# Patient Record
Sex: MALE | Race: WHITE | NOT HISPANIC OR LATINO | URBAN - METROPOLITAN AREA
[De-identification: names, ages, dates, MRNs, and addresses within clinical notes are randomized per-mention and may not be internally consistent; named-entity substitution may affect disease eponyms.]

---

## 2018-01-09 ENCOUNTER — COMMUNICATION - HEALTHEAST (OUTPATIENT)
Dept: SCHEDULING | Facility: CLINIC | Age: 38
End: 2018-01-09

## 2018-01-12 ENCOUNTER — OFFICE VISIT - HEALTHEAST (OUTPATIENT)
Dept: FAMILY MEDICINE | Facility: CLINIC | Age: 38
End: 2018-01-12

## 2018-01-12 DIAGNOSIS — I10 HYPERTENSION: ICD-10-CM

## 2018-01-12 DIAGNOSIS — F41.9 ANXIETY: ICD-10-CM

## 2018-01-12 ASSESSMENT — MIFFLIN-ST. JEOR: SCORE: 2299.7

## 2018-01-16 ENCOUNTER — OFFICE VISIT - HEALTHEAST (OUTPATIENT)
Dept: BEHAVIORAL HEALTH | Facility: HOSPITAL | Age: 38
End: 2018-01-16

## 2018-01-16 DIAGNOSIS — F41.1 GAD (GENERALIZED ANXIETY DISORDER): ICD-10-CM

## 2018-01-24 ENCOUNTER — AMBULATORY - HEALTHEAST (OUTPATIENT)
Dept: BEHAVIORAL HEALTH | Facility: CLINIC | Age: 38
End: 2018-01-24

## 2018-01-26 ENCOUNTER — OFFICE VISIT - HEALTHEAST (OUTPATIENT)
Dept: FAMILY MEDICINE | Facility: CLINIC | Age: 38
End: 2018-01-26

## 2018-01-26 DIAGNOSIS — F41.9 ANXIETY: ICD-10-CM

## 2018-01-26 DIAGNOSIS — I10 HTN (HYPERTENSION): ICD-10-CM

## 2018-01-30 ENCOUNTER — OFFICE VISIT - HEALTHEAST (OUTPATIENT)
Dept: BEHAVIORAL HEALTH | Facility: HOSPITAL | Age: 38
End: 2018-01-30

## 2018-01-30 DIAGNOSIS — F41.1 GAD (GENERALIZED ANXIETY DISORDER): ICD-10-CM

## 2018-02-09 ENCOUNTER — COMMUNICATION - HEALTHEAST (OUTPATIENT)
Dept: INTERNAL MEDICINE | Facility: CLINIC | Age: 38
End: 2018-02-09

## 2018-02-09 ENCOUNTER — OFFICE VISIT - HEALTHEAST (OUTPATIENT)
Dept: FAMILY MEDICINE | Facility: CLINIC | Age: 38
End: 2018-02-09

## 2018-02-09 DIAGNOSIS — F41.9 ANXIETY: ICD-10-CM

## 2018-02-09 DIAGNOSIS — I10 HYPERTENSION, UNSPECIFIED TYPE: ICD-10-CM

## 2018-02-09 LAB
ANION GAP SERPL CALCULATED.3IONS-SCNC: 12 MMOL/L (ref 5–18)
BUN SERPL-MCNC: 23 MG/DL (ref 8–22)
CALCIUM SERPL-MCNC: 10 MG/DL (ref 8.5–10.5)
CHLORIDE BLD-SCNC: 101 MMOL/L (ref 98–107)
CO2 SERPL-SCNC: 28 MMOL/L (ref 22–31)
CREAT SERPL-MCNC: 1.3 MG/DL (ref 0.7–1.3)
GFR SERPL CREATININE-BSD FRML MDRD: >60 ML/MIN/1.73M2
GLUCOSE BLD-MCNC: 95 MG/DL (ref 70–125)
POTASSIUM BLD-SCNC: 4.5 MMOL/L (ref 3.5–5)
SODIUM SERPL-SCNC: 141 MMOL/L (ref 136–145)

## 2018-02-20 ENCOUNTER — OFFICE VISIT - HEALTHEAST (OUTPATIENT)
Dept: BEHAVIORAL HEALTH | Facility: HOSPITAL | Age: 38
End: 2018-02-20

## 2018-02-20 DIAGNOSIS — F41.1 GAD (GENERALIZED ANXIETY DISORDER): ICD-10-CM

## 2018-08-01 ENCOUNTER — COMMUNICATION - HEALTHEAST (OUTPATIENT)
Dept: INTERNAL MEDICINE | Facility: CLINIC | Age: 38
End: 2018-08-01

## 2018-08-01 DIAGNOSIS — I10 HYPERTENSION, UNSPECIFIED TYPE: ICD-10-CM

## 2018-08-13 ENCOUNTER — OFFICE VISIT - HEALTHEAST (OUTPATIENT)
Dept: FAMILY MEDICINE | Facility: CLINIC | Age: 38
End: 2018-08-13

## 2018-08-13 DIAGNOSIS — I10 HYPERTENSION, UNSPECIFIED TYPE: ICD-10-CM

## 2018-08-13 DIAGNOSIS — Z23 NEED FOR TDAP VACCINATION: ICD-10-CM

## 2019-02-07 ENCOUNTER — OFFICE VISIT - HEALTHEAST (OUTPATIENT)
Dept: FAMILY MEDICINE | Facility: CLINIC | Age: 39
End: 2019-02-07

## 2019-02-07 DIAGNOSIS — I10 HYPERTENSION, UNSPECIFIED TYPE: ICD-10-CM

## 2019-02-07 DIAGNOSIS — F41.9 ANXIETY: ICD-10-CM

## 2019-02-07 LAB
ANION GAP SERPL CALCULATED.3IONS-SCNC: 10 MMOL/L (ref 5–18)
BUN SERPL-MCNC: 20 MG/DL (ref 8–22)
CALCIUM SERPL-MCNC: 9.6 MG/DL (ref 8.5–10.5)
CHLORIDE BLD-SCNC: 102 MMOL/L (ref 98–107)
CHOLEST SERPL-MCNC: 164 MG/DL
CO2 SERPL-SCNC: 27 MMOL/L (ref 22–31)
CREAT SERPL-MCNC: 1.3 MG/DL (ref 0.7–1.3)
FASTING STATUS PATIENT QL REPORTED: NO
GFR SERPL CREATININE-BSD FRML MDRD: >60 ML/MIN/1.73M2
GLUCOSE BLD-MCNC: 95 MG/DL (ref 70–125)
HDLC SERPL-MCNC: 28 MG/DL
LDLC SERPL CALC-MCNC: 74 MG/DL
POTASSIUM BLD-SCNC: 4.3 MMOL/L (ref 3.5–5)
SODIUM SERPL-SCNC: 139 MMOL/L (ref 136–145)
TRIGL SERPL-MCNC: 311 MG/DL

## 2019-02-11 ENCOUNTER — COMMUNICATION - HEALTHEAST (OUTPATIENT)
Dept: FAMILY MEDICINE | Facility: CLINIC | Age: 39
End: 2019-02-11

## 2019-06-04 ENCOUNTER — COMMUNICATION - HEALTHEAST (OUTPATIENT)
Dept: FAMILY MEDICINE | Facility: CLINIC | Age: 39
End: 2019-06-04

## 2019-06-04 DIAGNOSIS — I10 HYPERTENSION, UNSPECIFIED TYPE: ICD-10-CM

## 2019-06-04 DIAGNOSIS — F41.9 ANXIETY: ICD-10-CM

## 2019-08-07 ENCOUNTER — OFFICE VISIT - HEALTHEAST (OUTPATIENT)
Dept: FAMILY MEDICINE | Facility: CLINIC | Age: 39
End: 2019-08-07

## 2019-08-07 DIAGNOSIS — I10 HYPERTENSION, UNSPECIFIED TYPE: ICD-10-CM

## 2019-08-07 DIAGNOSIS — E78.1 HYPERTRIGLYCERIDEMIA: ICD-10-CM

## 2019-08-07 DIAGNOSIS — F41.9 ANXIETY: ICD-10-CM

## 2019-08-07 LAB
ANION GAP SERPL CALCULATED.3IONS-SCNC: 10 MMOL/L (ref 5–18)
BUN SERPL-MCNC: 15 MG/DL (ref 8–22)
CALCIUM SERPL-MCNC: 9.9 MG/DL (ref 8.5–10.5)
CHLORIDE BLD-SCNC: 103 MMOL/L (ref 98–107)
CHOLEST SERPL-MCNC: 161 MG/DL
CO2 SERPL-SCNC: 26 MMOL/L (ref 22–31)
CREAT SERPL-MCNC: 1.2 MG/DL (ref 0.7–1.3)
FASTING STATUS PATIENT QL REPORTED: YES
GFR SERPL CREATININE-BSD FRML MDRD: >60 ML/MIN/1.73M2
GLUCOSE BLD-MCNC: 96 MG/DL (ref 70–125)
HDLC SERPL-MCNC: 26 MG/DL
LDLC SERPL CALC-MCNC: 89 MG/DL
POTASSIUM BLD-SCNC: 4.1 MMOL/L (ref 3.5–5)
SODIUM SERPL-SCNC: 139 MMOL/L (ref 136–145)
TRIGL SERPL-MCNC: 230 MG/DL

## 2019-08-12 ENCOUNTER — COMMUNICATION - HEALTHEAST (OUTPATIENT)
Dept: FAMILY MEDICINE | Facility: CLINIC | Age: 39
End: 2019-08-12

## 2020-08-06 ENCOUNTER — OFFICE VISIT - HEALTHEAST (OUTPATIENT)
Dept: UROLOGY | Facility: CLINIC | Age: 40
End: 2020-08-06

## 2020-08-06 ENCOUNTER — COMMUNICATION - HEALTHEAST (OUTPATIENT)
Dept: UROLOGY | Facility: CLINIC | Age: 40
End: 2020-08-06

## 2020-08-06 DIAGNOSIS — N13.2 HYDRONEPHROSIS WITH URINARY OBSTRUCTION DUE TO URETERAL CALCULUS: ICD-10-CM

## 2020-08-06 DIAGNOSIS — N20.1 CALCULUS OF URETER: ICD-10-CM

## 2020-08-24 ENCOUNTER — HOSPITAL ENCOUNTER (OUTPATIENT)
Dept: CT IMAGING | Facility: HOSPITAL | Age: 40
Discharge: HOME | End: 2020-08-24
Attending: PHYSICIAN ASSISTANT

## 2020-08-24 DIAGNOSIS — N20.1 CALCULUS OF URETER: ICD-10-CM

## 2020-08-25 ENCOUNTER — OFFICE VISIT - HEALTHEAST (OUTPATIENT)
Dept: UROLOGY | Facility: CLINIC | Age: 40
End: 2020-08-25

## 2020-08-25 DIAGNOSIS — N20.1 CALCULUS OF URETER: ICD-10-CM

## 2020-08-25 DIAGNOSIS — N13.2 HYDRONEPHROSIS WITH URINARY OBSTRUCTION DUE TO URETERAL CALCULUS: ICD-10-CM

## 2020-08-26 ENCOUNTER — COMMUNICATION - HEALTHEAST (OUTPATIENT)
Dept: FAMILY MEDICINE | Facility: CLINIC | Age: 40
End: 2020-08-26

## 2020-08-26 DIAGNOSIS — I10 HYPERTENSION, UNSPECIFIED TYPE: ICD-10-CM

## 2020-08-28 ENCOUNTER — OFFICE VISIT - HEALTHEAST (OUTPATIENT)
Dept: FAMILY MEDICINE | Facility: CLINIC | Age: 40
End: 2020-08-28

## 2020-08-28 DIAGNOSIS — I10 HYPERTENSION, UNSPECIFIED TYPE: ICD-10-CM

## 2020-08-28 DIAGNOSIS — F41.9 ANXIETY: ICD-10-CM

## 2020-08-28 DIAGNOSIS — Z13.220 LIPID SCREENING: ICD-10-CM

## 2020-08-28 DIAGNOSIS — R73.9 BLOOD GLUCOSE ELEVATED: ICD-10-CM

## 2020-08-28 LAB
ANION GAP SERPL CALCULATED.3IONS-SCNC: 11 MMOL/L (ref 5–18)
BUN SERPL-MCNC: 28 MG/DL (ref 8–22)
CALCIUM SERPL-MCNC: 9.6 MG/DL (ref 8.5–10.5)
CHLORIDE BLD-SCNC: 101 MMOL/L (ref 98–107)
CHOLEST SERPL-MCNC: 154 MG/DL
CO2 SERPL-SCNC: 26 MMOL/L (ref 22–31)
CREAT SERPL-MCNC: 1.5 MG/DL (ref 0.7–1.3)
FASTING STATUS PATIENT QL REPORTED: NO
GFR SERPL CREATININE-BSD FRML MDRD: 52 ML/MIN/1.73M2
GLUCOSE BLD-MCNC: 94 MG/DL (ref 70–125)
HBA1C MFR BLD: 5.4 %
HDLC SERPL-MCNC: 25 MG/DL
LDLC SERPL CALC-MCNC: 96 MG/DL
POTASSIUM BLD-SCNC: 4.4 MMOL/L (ref 3.5–5)
SODIUM SERPL-SCNC: 138 MMOL/L (ref 136–145)
TRIGL SERPL-MCNC: 166 MG/DL

## 2020-08-28 RX ORDER — LORATADINE 10 MG/1
10 TABLET ORAL DAILY
Status: SHIPPED | COMMUNITY
Start: 2020-08-28

## 2020-08-28 RX ORDER — HYDROCHLOROTHIAZIDE 25 MG/1
25 TABLET ORAL DAILY
Qty: 90 TABLET | Refills: 3 | Status: SHIPPED | OUTPATIENT
Start: 2020-08-28 | End: 2021-10-19

## 2020-08-28 RX ORDER — LISINOPRIL 30 MG/1
30 TABLET ORAL DAILY
Qty: 90 TABLET | Refills: 3 | Status: SHIPPED | OUTPATIENT
Start: 2020-08-28 | End: 2021-10-19

## 2020-08-28 RX ORDER — HYDROXYZINE HYDROCHLORIDE 25 MG/1
25 TABLET, FILM COATED ORAL
Qty: 90 TABLET | Refills: 3 | Status: SHIPPED | OUTPATIENT
Start: 2020-08-28 | End: 2022-01-19

## 2020-08-28 ASSESSMENT — MIFFLIN-ST. JEOR: SCORE: 2346.88

## 2020-08-31 ENCOUNTER — COMMUNICATION - HEALTHEAST (OUTPATIENT)
Dept: FAMILY MEDICINE | Facility: CLINIC | Age: 40
End: 2020-08-31

## 2021-02-23 ENCOUNTER — COMMUNICATION - HEALTHEAST (OUTPATIENT)
Dept: ADMINISTRATIVE | Facility: CLINIC | Age: 41
End: 2021-02-23

## 2021-02-26 ENCOUNTER — OFFICE VISIT - HEALTHEAST (OUTPATIENT)
Dept: FAMILY MEDICINE | Facility: CLINIC | Age: 41
End: 2021-02-26

## 2021-02-26 DIAGNOSIS — Z23 NEED FOR VACCINATION: ICD-10-CM

## 2021-02-26 DIAGNOSIS — I10 HYPERTENSION, UNSPECIFIED TYPE: ICD-10-CM

## 2021-02-26 ASSESSMENT — MIFFLIN-ST. JEOR: SCORE: 2243.46

## 2021-05-29 NOTE — TELEPHONE ENCOUNTER
Refill Request  Did you contact pharmacy: Yes Patient is in [facility] andforgot his medication at home.  He is asking for two days a medicaiton for each med ASAP please.   Medication name:   Requested Prescriptions     Pending Prescriptions Disp Refills     propranolol (INDERAL LA) 60 mg 24 hr capsule 90 capsule 1     Sig: Take 1 capsule (60 mg total) by mouth daily.     lisinopril (PRINIVIL,ZESTRIL) 30 MG tablet 90 tablet 1     Sig: Take 1 tablet (30 mg total) by mouth daily.     hydrOXYzine HCl (ATARAX) 25 MG tablet 90 tablet 1     Sig: TK 1 TO 2 TS PO TID PRF ANXIETY     Who prescribed the medication: PCPPharmacy Name and Location: [facility] [address] [place] [address]  Is patient out of medication: Yes  Patient notified refills processed in 72 hours:  no  Okay to leave a detailed message: yes [phone]

## 2021-05-30 ENCOUNTER — HEALTH MAINTENANCE LETTER (OUTPATIENT)
Age: 41
End: 2021-05-30

## 2021-05-31 VITALS — HEIGHT: 70 IN | WEIGHT: 307.2 LBS | BODY MASS INDEX: 43.98 KG/M2

## 2021-05-31 VITALS — BODY MASS INDEX: 43.91 KG/M2 | WEIGHT: 306 LBS

## 2021-05-31 NOTE — PROGRESS NOTES
Assessment/Plan:        1. Hypertension, unspecified type  Normotension  Continue with current management  - hydroCHLOROthiazide (HYDRODIURIL) 25 MG tablet; Take 1 tablet (25 mg total) by mouth daily.  Dispense: 90 tablet; Refill: 3  - lisinopril (PRINIVIL,ZESTRIL) 30 MG tablet; Take 1 tablet (30 mg total) by mouth daily.  Dispense: 90 tablet; Refill: 3  - propranolol (INDERAL LA) 60 mg 24 hr capsule; Take 1 capsule (60 mg total) by mouth daily.  Dispense: 90 capsule; Refill: 3    - Basic Metabolic Panel; Future      2. Anxiety  Doing well the current plan  Continue as is    3. Hypertriglyceridemia  Recheck lab  - Lipid Profile   Elevated Triglycerides, but better in comparison than 6 months ago      Follow-up in 6 to 12 months or sooner with questions or concerns.       Subjective:    Patient ID:   Mauri Manzanares is a 38 y.o. male comes in for follow up of    1. Hypertension, unspecified type    2. Anxiety    3. Hypertriglyceridemia        Reports to be doing well with no medication side effects or intolerance, and been able to keep the blood pressure in the normal range with the current regimen.  Terms of anxiety doing well on hydroxyzine taking only as needed and happy with the current management.    Review of Systems  Allergy: reviewed  General : negative  A complete 5 point review of systems was obtained and is negative other than what is stated in the HPI.       The following patient's history were reviewed and updated as appropriate:   He  has a past medical history of Hypertension..      Outpatient Encounter Medications as of 8/7/2019   Medication Sig Dispense Refill     hydrOXYzine HCl (ATARAX) 25 MG tablet TK 1 TO 2 TS PO TID PRF ANXIETY 6 tablet 0     propranolol (INDERAL LA) 60 mg 24 hr capsule Take 1 capsule (60 mg total) by mouth daily. 3 capsule 0     traZODone (DESYREL) 50 MG tablet TK 1 TO 2 TS PO D HS PRF SLEEP  1     hydroCHLOROthiazide (HYDRODIURIL) 25 MG tablet Take 1 tablet (25 mg total) by  mouth daily. 90 tablet 1     lisinopril (PRINIVIL,ZESTRIL) 30 MG tablet Take 1 tablet (30 mg total) by mouth daily. 3 tablet 0     No facility-administered encounter medications on file as of 8/7/2019.          Objective:   /80 (Patient Site: Right Arm, Patient Position: Sitting, Cuff Size: Adult Large)   Pulse 86   Wt (!) 343 lb (155.6 kg)   SpO2 97%   BMI 49.22 kg/m        Physical Exam  General Appearance:    Alert, well hydrated, no distress   Throat:   mucous membranes moist, pharynx normal without lesions   Neck:   Supple, symmetrical, trachea midline, no adenopathy;     thyroid:  no enlargement/tenderness/nodules;    Lungs:     clear to auscultation, no wheezes, rales or rhonchi, symmetric air entry     Heart:    Regular rate and rhythm, S1 and S2 normal, no murmur, rub   or gallop, no edema    Skin:   Skin color, texture, turgor normal, no rashes or lesions

## 2021-06-01 VITALS — WEIGHT: 315 LBS | BODY MASS INDEX: 47.51 KG/M2

## 2021-06-01 VITALS — WEIGHT: 307.2 LBS | BODY MASS INDEX: 44.08 KG/M2

## 2021-06-02 VITALS — WEIGHT: 315 LBS | BODY MASS INDEX: 48.83 KG/M2

## 2021-06-03 VITALS — WEIGHT: 315 LBS | BODY MASS INDEX: 49.22 KG/M2

## 2021-06-04 VITALS
SYSTOLIC BLOOD PRESSURE: 118 MMHG | HEART RATE: 80 BPM | OXYGEN SATURATION: 98 % | BODY MASS INDEX: 46.65 KG/M2 | TEMPERATURE: 97.2 F | DIASTOLIC BLOOD PRESSURE: 72 MMHG | HEIGHT: 69 IN | RESPIRATION RATE: 22 BRPM | WEIGHT: 315 LBS

## 2021-06-05 VITALS
TEMPERATURE: 97.5 F | WEIGHT: 298.3 LBS | HEART RATE: 66 BPM | HEIGHT: 69 IN | RESPIRATION RATE: 18 BRPM | DIASTOLIC BLOOD PRESSURE: 76 MMHG | OXYGEN SATURATION: 100 % | SYSTOLIC BLOOD PRESSURE: 130 MMHG | BODY MASS INDEX: 44.18 KG/M2

## 2021-06-10 NOTE — PROGRESS NOTES
Assessment/Plan:        1. Hypertension, unspecified type  Normotension  Continue current management.     Refil;   - propranoloL (INDERAL LA) 60 mg 24 hr capsule; Take 1 capsule (60 mg total) by mouth daily.  Dispense: 90 capsule; Refill: 3  - lisinopriL (PRINIVIL,ZESTRIL) 30 MG tablet; Take 1 tablet (30 mg total) by mouth daily.  Dispense: 90 tablet; Refill: 3  - hydroCHLOROthiazide (HYDRODIURIL) 25 MG tablet; Take 1 tablet (25 mg total) by mouth daily.  Dispense: 90 tablet; Refill: 3      - Basic Metabolic Panel  Elevated creatinine  Most likely due to use of blood pressure medication  No significant change from the prior study  Keep hydrated and continue with the current management    2. Blood glucose elevated  - Glycosylated Hemoglobin A1c  Normal A1c, with a value of 5.4 very close to prediabetic range    Normal <5.7% Prediabete 5.7-6.4%  Recheck level in 6 months     3. Lipid screening    - Lipid Profile  The 10-year ASCVD risk score ([place] DC Jr., et al., 2013) is: 2%    Routine diet and exercise advised  Recheck in 1 year    4. Anxiety  Refill Hydroxyzine.         At the conclusion of the encounter the plan of care, disposition and all questions were answered and reviewed, and the patient acknowledged understanding and was involved in the decision making regarding the overall care plan.     Patient Care Team:  Dave Kirk MD as PCP - General (Family Medicine)  Dave Kirk MD as Assigned PCP    45  minutes spent on this visit with more than 50% time spent on  reviewing medical record, education, providing supportive therapy regarding coping with chronic illness, entering orders, reviewing and commenting on the test results, and preparing documentation for the visit           Subjective:    Patient ID:   Mauri Manzanares is a 39 y.o. male comes in 1 year follow-up of hypertension managed on hydrochlorothiazide 25 mg, lisinopril 30 mg daily and propanolol 60 mg daily, without side effects  "or intolerance , needing a refill.     He has no other concerns.      Review of Systems  Allergy: reviewed  General : negative  A complete 5 point review of systems was obtained and is negative other than what is stated in the HPI.      The following patient's history were reviewed and updated as appropriate:   He  has a past medical history of Hypertension..      Outpatient Encounter Medications as of 8/28/2020   Medication Sig Dispense Refill     hydroCHLOROthiazide (HYDRODIURIL) 25 MG tablet Take 1 tablet (25 mg total) by mouth daily. 90 tablet 3     hydrOXYzine HCl (ATARAX) 25 MG tablet TK 1 TO 2 TS PO TID PRF ANXIETY 6 tablet 0     lisinopril (PRINIVIL,ZESTRIL) 30 MG tablet Take 1 tablet (30 mg total) by mouth daily. 90 tablet 3     loratadine (CLARITIN) 10 mg tablet Take 10 mg by mouth daily.       ondansetron (ZOFRAN ODT) 4 MG disintegrating tablet Take 1 tablet (4 mg total) by mouth every 8 (eight) hours as needed for nausea. 20 tablet 0     propranolol (INDERAL LA) 60 mg 24 hr capsule Take 1 capsule (60 mg total) by mouth daily. 90 capsule 3     [DISCONTINUED] traZODone (DESYREL) 50 MG tablet TK 1 TO 2 TS PO D HS PRF SLEEP  1     No facility-administered encounter medications on file as of 8/28/2020.          Objective:   /72 (Patient Site: Right Arm, Patient Position: Sitting, Cuff Size: Adult Large)   Pulse 80   Temp 97.2  F (36.2  C) (Tympanic)   Resp 22   Ht 5' 9\" (1.753 m)   Wt (!) 321 lb 1.6 oz (145.7 kg)   SpO2 98%   BMI 47.42 kg/m        Physical Exam  General Appearance:    Alert, well hydrated, no distress   Throat:   mucous membranes moist, pharynx normal without lesions   Neck:   Supple, symmetrical, trachea midline, no adenopathy;     thyroid:  no enlargement/tenderness/nodules;    Lungs:     clear to auscultation, no wheezes, rales or rhonchi, symmetric air entry     Heart:    Regular rate and rhythm, S1 and S2 normal, no murmur, rub   or gallop, no edema    Skin:   Skin color, " texture, turgor normal, no rashes or lesions

## 2021-06-10 NOTE — PATIENT INSTRUCTIONS - HE
Calcium Oxalate Stone Prevention Self Management    Drink more fluids:    Drinking more liquids is the best way you can help prevent future stones. Stones can form when substances in the urine are too concentrated. The more you drink, the more urine you will make. This means all substances in the urine will be less concentrated.    How much urine should I be producing?    The usual recommended daily urine production is about 2 to 3 quarts (9784-2638 ml). If you are producing more than 3 quarts of urine on a regular basis, it is possible to deplete important minerals stored in the body.    To measure the amount of urine you produce in a day, you can either:    Collect all urine in a container and measure at the end of the day     Use a measuring cup each time you urinate and add up the amounts at the end of the day     Observe    Color - Dark rebecca urine is concentrated. Light straw color or lighter is dilute and desirable     Odor - Concentrated urine tends to smell stronger. Dilute urine is nearly odorless    Ways to increase your fluid intake    Increasing the amount of fluid you drink is effective for all types of kidney stones. While water is commonly recommended, all fluids are effective for increasing the amount of urine your body produces.    Focus on starting a lifelong habit, rather than a short-term solution.     Keep liquids on hand that you like. Crystal Light is a low calorie appropriate choice.    Drink out of larger glasses. You'll tend to drink more with each serving.     Have an additional glass of fluid with each meal.     Keep a water or drink bottle at work and fill it regularly.     *If you are prone to fluid retention, consult your doctor before making changes to your fluid habits.    Low Oxalate Diet:    Avoid excess amounts or daily consumption of these foods:    All nuts and nut products including peanuts, almonds, pecans, peanut butter, almond milk    Rhubarb    Chocolate    Soybeans and  soy products     Spinach    Wheat Germ    Beets    Maintain a normal calcium diet:    Researches have found that people with low calcium intakes tend to have more stones. Foods with high calcium content are acceptable and include:    Dairy products (including milk, cheese and yogurt)    Meat and fish    Enriched cereals    Dark green vegetables    What about calcium supplements?     Many people take calcium supplements, either on their own or as prescribed by a doctor. Research has indicated that calcium supplements do not usually pose a risk for stone formation.  Calcium citrate is a better choice for a supplement.    Avoid excess salt:    Salt (sodium chloride) is found in abundance in many foods. High sodium levels in the urine can interfere with the kidney's handling of calcium.     Tips for reducing the salt in your diet:    Don't use salt at the table    Reduce the salt used in food preparation. Try 1/2 teaspoon when recipes call for 1 teaspoon.    Use herbs and spices for flavoring instead of salt.    Avoid salty foods.    Check the label before you buy or use a product. Note sodium and portion size information.    Try to consume less than 2,000 mg/day. (1 teaspoon = 2,000 mg)    Foods with high sodium content include:    Processed meat (including luncheon meats, sausage)     Crackers     Instant cereal     Processed cheese     Canned soups     Chips and snack foods     Soy sauce    [facility] can respond to your questions or concerns 24 hours a day at [phone].

## 2021-06-10 NOTE — TELEPHONE ENCOUNTER
Last Office Visit  8/7/2019 Dave Kirk MD  Notes:  1. Hypertension, unspecified type  Normotension  Continue with current management  - hydroCHLOROthiazide (HYDRODIURIL) 25 MG tablet; Take 1 tablet (25 mg total) by mouth daily.  Dispense: 90 tablet; Refill: 3  - lisinopril (PRINIVIL,ZESTRIL) 30 MG tablet; Take 1 tablet (30 mg total) by mouth daily.  Dispense: 90 tablet; Refill: 3  - propranolol (INDERAL LA) 60 mg 24 hr capsule; Take 1 capsule (60 mg total) by mouth daily.  Dispense: 90 capsule; Refill: 3     - Basic Metabolic Panel; Future       2. Anxiety  Doing well the current plan  Continue as is     3. Hypertriglyceridemia  Recheck lab  - Lipid Profile   Elevated Triglycerides, but better in comparison than 6 months ago      Follow-up in 6 to 12 months or sooner with questions or concerns.     Last Filled:  hydroCHLOROthiazide (HYDRODIURIL) 25 MG tablet  90 tablet  3  8/7/2019 9/6/2019  No    Sig - Route: Take 1 tablet (25 mg total) by mouth daily. - Oral    Sent to pharmacy as: hydroCHLOROthiazide (HYDRODIURIL) 25 MG tablet    E-Prescribing Status: Receipt confirmed by pharmacy (8/7/2019  8:18 AM CDT)      lisinopril (PRINIVIL,ZESTRIL) 30 MG tablet  90 tablet  3  8/7/2019 9/6/2019  No    Sig - Route: Take 1 tablet (30 mg total) by mouth daily. - Oral    Sent to pharmacy as: lisinopril (PRINIVIL,ZESTRIL) 30 MG tablet    E-Prescribing Status: Receipt confirmed by pharmacy (8/7/2019  8:18 AM CDT)        Next OV:  8/28/2020 Dave Kirk MD        Medication teed up for provider signature

## 2021-06-10 NOTE — PROGRESS NOTES
"Assessment/Plan:        Diagnoses and all orders for this visit:    Calculus of ureter  -     Symptom Control While Passing a Stone Education  -     CT Abdomen Pelvis Without Oral Without IV Contrast; Future; Expected date: 08/20/2020  -     Patient Stated Goal: Pass my stone    Hydronephrosis with urinary obstruction due to ureteral calculus      Stone Management Plan  [facility] 8/6/2020   Urinary Tract Infection No suspicion of infection   Renal Colic Well controlled symptoms   Renal Failure No suspicion of renal failure   Current CT date 8/6/2020   Right sided stones? Yes   R Number of ureteral stones 1   R GSD of ureteral stones 2   R Location of ureteral stone Distal   R Number of kidney stones  No renal stones   R GSD of kidney stones N/A   R Hydronephrosis Mild   R Stone Event New event   Diagnosis date 8/6/2020   Initial location of primary symptomatic stone Distal   Initial GSD of primary symptomatic stone 2   R MET status Initiation   R Current Plan MET   MET 2 week F/U   Left sided stones? No   L Hydronephrosis None   L Stone Event No current event         Phone call duration: 11 minutes    Popyp Aguilar PA-C    Subjective:        The patient has been notified of following:     \"This telephone visit will be conducted via a call between you and your physician/provider. We have found that certain health care needs can be provided without the need for a physical exam.  This service lets us provide the care you need with a short phone conversation. If a prescription is necessary, we can send it directly to your pharmacy.  If labs and/or imaging are needed, we can place orders so that you can have the test (s) done at a later time.    If during the course of the call the physician/provider feels a telephone visit is not appropriate, you will not be charged for this service.\"     HPI  Mr. Mauri Manzanares is a 39 y.o.  male who is being evaluated via a billable telephone visit by [facility] following [place]'s ER visit for urolithiasis.    He is a first time unidentified composition stone former. He has not previously participated in stone risk evaluation. He has no identified modifiable stone risk factors. He has no identified non-modifiable stone risk factors.    He was evaluated in ER overnight for acute onset right flank pain x 2 hours. The pain was sharp, constant and radiated to the right lower abdomen. At its worst, it was 10/10 with no associated alleviating or aggravating factors. He had associated vomiting, abdominal bloating, and mild urinary urgency. He took tylenol with some relief, pain improved to 5/10. Workup was notable for CT reporting an obstructing right ureteral stone. Labs were negative for infection or renal injury. He was sent home with zofran and oxycodone.    He currently has 3/10 right abdominal pain and urgency with mild dysuria. He denies symptoms of fever, chills, nausea, vomiting, urinary frequency and dysuria.     CT scan from earlier today is personally reviewed and demonstrates a mildly obstructing 2 mm right distal ureteral stone.    Significant labs from presentation include moderate hematuria, no pyuria, negative nitrite, no bacteria, normal WBC, normal creatinine and normal potassium.    PLAN    40 yo M first time stone former with obstructing right distal ureteral stone, symptoms currently controlled.    Will proceed with medical expulsive therapy. Risks and benefits were detailed of medical expulsive therapy including probability of stone passage, recurrent renal colic, and requirement of emergency medical and/or surgical care and further imaging. Patient verbalized understanding. Patient agrees with plan as discussed. He will return in 2-4 weeks with low dose CT scan.    For symptom control, he was prescribed oxycodone and ondansetron. Over the counter symptom control medications of ibuprofen, Dramamine and Tylenol were  recommended.     ROS   A 12 point comprehensive review of systems is negative except for HPI    Past Medical History:   Diagnosis Date     Hypertension        Past Surgical History:   Procedure Laterality Date     TONSILLECTOMY         Current Outpatient Medications   Medication Sig Dispense Refill     propranolol (INDERAL LA) 60 mg 24 hr capsule Take 1 capsule (60 mg total) by mouth daily. 90 capsule 3     acetaminophen (TYLENOL) 500 MG tablet Take 2 tablets (1,000 mg total) by mouth 4 (four) times a day for 7 days. 56 tablet 0     dimenhyDRINATE (DRAMAMINE) 50 MG tablet Take 1 tablet (50 mg total) by mouth at bedtime for 7 days. 7 tablet 0     dimenhyDRINATE (DRAMAMINE) 50 MG tablet Take 1 tablet (50 mg total) by mouth 4 (four) times a day as needed. 28 tablet 0     hydroCHLOROthiazide (HYDRODIURIL) 25 MG tablet Take 1 tablet (25 mg total) by mouth daily. 90 tablet 3     hydrOXYzine HCl (ATARAX) 25 MG tablet TK 1 TO 2 TS PO TID PRF ANXIETY 6 tablet 0     ibuprofen (ADVIL,MOTRIN) 200 MG tablet Take 2 tablets (400 mg total) by mouth 4 (four) times a day for 7 days. 56 tablet 0     lisinopril (PRINIVIL,ZESTRIL) 30 MG tablet Take 1 tablet (30 mg total) by mouth daily. 90 tablet 3     ondansetron (ZOFRAN ODT) 4 MG disintegrating tablet Take 1 tablet (4 mg total) by mouth every 8 (eight) hours as needed for nausea. 20 tablet 0     oxyCODONE (ROXICODONE) 5 MG immediate release tablet Every 4-6 hours as needed if pain is not improved with acetaminophen and ibuprofen. 12 tablet 0     traZODone (DESYREL) 50 MG tablet TK 1 TO 2 TS PO D HS PRF SLEEP  1     No current facility-administered medications for this visit.        No Known Allergies    Social History     Socioeconomic History     Marital status:      Spouse name: Not on file     Number of children: Not on file     Years of education: Not on file     Highest education level: Not on file   Occupational History     Not on file   Social Needs     Financial  resource strain: Not on file     Food insecurity     Worry: Not on file     Inability: Not on file     Transportation needs     Medical: Not on file     Non-medical: Not on file   Tobacco Use     Smoking status: Never Smoker     Smokeless tobacco: Never Used   Substance and Sexual Activity     Alcohol use: Yes     Comment: Occasionally      Drug use: No     Sexual activity: Not on file   Lifestyle     Physical activity     Days per week: Not on file     Minutes per session: Not on file     Stress: Not on file   Relationships     Social connections     Talks on phone: Not on file     Gets together: Not on file     Attends Yazidi service: Not on file     Active member of club or organization: Not on file     Attends meetings of clubs or organizations: Not on file     Relationship status: Not on file     Intimate partner violence     Fear of current or ex partner: Not on file     Emotionally abused: Not on file     Physically abused: Not on file     Forced sexual activity: Not on file   Other Topics Concern     Not on file   Social History Narrative     Not on file       Family History   Problem Relation Age of Onset     Hypertension Mother      No Medical Problems Father      Leukemia Brother        Objective:      Physical Exam  There were no vitals filed for this visit.    Labs    Urinalysis POC (Office):  Nitrite, UA   Date Value Ref Range Status   08/06/2020 Negative Negative Final       Lab Urinalysis:  Blood, UA   Date Value Ref Range Status   08/06/2020 Moderate (!) Negative Final     Nitrite, UA   Date Value Ref Range Status   08/06/2020 Negative Negative Final     Leukocytes, UA   Date Value Ref Range Status   08/06/2020 Negative Negative Final     pH, UA   Date Value Ref Range Status   08/06/2020 5.0 4.5 - 8.0 Final    and Acute Labs   CBC   WBC   Date Value Ref Range Status   08/06/2020 9.1 4.0 - 11.0 thou/uL Final   01/06/2018 9.3 4.0 - 11.0 thou/uL Final     Hemoglobin   Date Value Ref Range Status    08/06/2020 14.4 14.0 - 18.0 g/dL Final   01/06/2018 15.6 14.0 - 18.0 g/dL Final     Platelets   Date Value Ref Range Status   08/06/2020 272 140 - 440 thou/uL Final   01/06/2018 269 140 - 440 thou/uL Final    and Renal Panel  [facility]  Creatinine   Date Value Ref Range Status   08/06/2020 1.54 (H) 0.70 - 1.30 mg/dL Final   08/07/2019 1.20 0.70 - 1.30 mg/dL Final   02/07/2019 1.30 0.70 - 1.30 mg/dL Final     Potassium   Date Value Ref Range Status   08/06/2020 4.3 3.5 - 5.0 mmol/L Final   08/07/2019 4.1 3.5 - 5.0 mmol/L Final   02/07/2019 4.3 3.5 - 5.0 mmol/L Final     Calcium   Date Value Ref Range Status   08/06/2020 9.5 8.5 - 10.5 mg/dL Final   08/07/2019 9.9 8.5 - 10.5 mg/dL Final   02/07/2019 9.6 8.5 - 10.5 mg/dL Final

## 2021-06-10 NOTE — PROGRESS NOTES
"Assessment/Plan:        Diagnoses and all orders for this visit:    Calculus of ureter    Hydronephrosis with urinary obstruction due to ureteral calculus    Stone Management Plan  [facility] 8/6/2020   Urinary Tract Infection No suspicion of infection   Renal Colic Well controlled symptoms   Renal Failure No suspicion of renal failure   Current CT date 8/6/2020   Right sided stones? Yes   R Number of ureteral stones 1   R GSD of ureteral stones 2   R Location of ureteral stone Distal   R Number of kidney stones  No renal stones   R GSD of kidney stones N/A   R Hydronephrosis Mild   R Stone Event New event   Diagnosis date 8/6/2020   Initial location of primary symptomatic stone Distal   Initial GSD of primary symptomatic stone 2   R MET status Initiation   R Current Plan MET   MET 2 week F/U   Left sided stones? No   L Hydronephrosis None   L Stone Event No current event         Phone call duration: 6 minutes    Poppy Aguilar PA-C     Subjective:      The patient has been notified of following:     \"This telephone visit will be conducted via a call between you and your physician/provider. We have found that certain health care needs can be provided without the need for a physical exam.  This service lets us provide the care you need with a short phone conversation.  If a prescription is necessary we can send it directly to your pharmacy.  If labs and/or imaging are needed, we can place orders so you can have the test (s) done at a later time.    If during the course of the call the physician/provider feels a telephone visit is not appropriate, you will not be charged for this service.\"     HPI  Mr. Mauri Manzanares is a 39 y.o.  male who is being evaluated via a billable telephone visit by [facility] for medical expulsive therapy follow up.     On last encounter, his 2 mm stone was in right distal ureter with mild hydronephrosis. He has had no unanticipated " events.    He has been symptom free since last encounter. He is asymptomatic at present. He denies symptoms of fever, chills, flank pain, nausea, vomiting, urinary frequency and dysuria.     New CT scan was personally reviewed and demonstrates passage of stone with resolution of previous hydronephrosis.     PLAN    40 yo M first time stone former with interval passage of right distal ureteral stone, no specimen retrieved. No current stone burden.    He is congratulated on passing his stone and will not proceed with stone risk evaluation. We reviewed conservative dietary recommendations for stone prevention. He will follow up on a prn basis.       ROS   Review of systems is negative except for HPI.    Past Medical History:   Diagnosis Date     Hypertension        Past Surgical History:   Procedure Laterality Date     TONSILLECTOMY         Current Outpatient Medications   Medication Sig Dispense Refill     hydroCHLOROthiazide (HYDRODIURIL) 25 MG tablet Take 1 tablet (25 mg total) by mouth daily. 90 tablet 3     hydrOXYzine HCl (ATARAX) 25 MG tablet TK 1 TO 2 TS PO TID PRF ANXIETY 6 tablet 0     lisinopril (PRINIVIL,ZESTRIL) 30 MG tablet Take 1 tablet (30 mg total) by mouth daily. 90 tablet 3     ondansetron (ZOFRAN ODT) 4 MG disintegrating tablet Take 1 tablet (4 mg total) by mouth every 8 (eight) hours as needed for nausea. 20 tablet 0     propranolol (INDERAL LA) 60 mg 24 hr capsule Take 1 capsule (60 mg total) by mouth daily. 90 capsule 3     traZODone (DESYREL) 50 MG tablet TK 1 TO 2 TS PO D HS PRF SLEEP  1     No current facility-administered medications for this visit.        No Known Allergies    Social History     Socioeconomic History     Marital status:      Spouse name: Not on file     Number of children: Not on file     Years of education: Not on file     Highest education level: Not on file   Occupational History     Not on file   Social Needs     Financial resource strain: Not on file     Food  insecurity     Worry: Not on file     Inability: Not on file     Transportation needs     Medical: Not on file     Non-medical: Not on file   Tobacco Use     Smoking status: Never Smoker     Smokeless tobacco: Never Used   Substance and Sexual Activity     Alcohol use: Yes     Comment: Occasionally      Drug use: No     Sexual activity: Not on file   Lifestyle     Physical activity     Days per week: Not on file     Minutes per session: Not on file     Stress: Not on file   Relationships     Social connections     Talks on phone: Not on file     Gets together: Not on file     Attends Restorationism service: Not on file     Active member of club or organization: Not on file     Attends meetings of clubs or organizations: Not on file     Relationship status: Not on file     Intimate partner violence     Fear of current or ex partner: Not on file     Emotionally abused: Not on file     Physically abused: Not on file     Forced sexual activity: Not on file   Other Topics Concern     Not on file   Social History Narrative     Not on file       Family History   Problem Relation Age of Onset     Hypertension Mother      No Medical Problems Father      Leukemia Brother

## 2021-06-10 NOTE — PATIENT INSTRUCTIONS - HE
Patient Stated Goal: Pass my stone  Symptom Control While Passing A Stone    The goal of [facility] is to let a smaller kidney stone (less than 4 to 5 mm) pass without intervention if possible. Giving your body a chance to clear the stone may take a few hours up to a few weeks.  Keeping you well-informed, safe and fairly comfortable is important.    Drink to thirst  Do not attempt to  flush out  your stone by drinking too much fluid. This does not work and may increase nausea. Drink enough to satisfy your body s thirst. Eating your normal diet is fine.   Medications (that may be suggested or prescribed)    Ibuprofen (Advil or Motrin) Available over the counter  o Take two (200mg) tablets every six hours until the stone passes.  o Prevents spasm of the ureter.    o Decreases pain.      Dramamine* (drowsy version, non-generic formulation) Available over the counter  o Take 50mg at bedtime  o Decreases spasms of the ureter  o Decreases nausea  o Decreases acute pain  o Decreases recurrence of pain for 24 hours  o Will help you sleep  *This medication will cause increase drowsiness, do not drive or operate machinery for 6 hours.      Narcotics (Percocet, Vicodin, Dilaudid) Take as prescribed for severe pain unrelieved by ibuprofen and Dramamine  o Narcotics have significant side effects and only  cover-up  pain. They have no effect on the cause of pain.  o Common side effects  - Confusion, disorientation and sedation - DO NOT DRIVE OR OPERATE MACHINERY WITHIN 24 HOURS  - Nausea - take Dramamine or Zofran or Haldol to help control  - Constipation  - Sleep disturbances      Ondansetron (Zofran) Take as prescribed  o Reserve for severe nausea  o May cause constipation, start over the counter Miralax if needed      Second Line Anti-Nausea Medication: Adding a different anti-nausea medication maybe helpful for persistent nausea.  The combined effect of different types of anti-nausea medications maybe more  effective than either medication by itself, even in higher doses.  o Compazine: Take as prescribed      Information about kidney stones    Crystals can form if chemicals are too concentrated in your urine. If the crystal grows over time, a stone may form. A stone usually isn t painful while it is still in the kidney.    As the stone begins to leave the kidney, you may experience episodes of flank pain as the kidney stone approaches the entrance to the ureter. Some people feel a vague ache in the side.    Kidney stones may fall into the ureter. Some stones are tiny and pass through without causing symptoms. The ureter is a small tube (approximately 1/8 of an inch wide). A kidney stone can get stuck and block the ureter. If this happens, urine backs up and flows back to the kidney. Back pressure on the kidney can cause:  o Severe flank pain radiating to the groin.  o Severe nausea and vomiting.  o The pain can occur in the lower back, side, groin or all three.      When the stone reaches the lower ureter, this can irritate the bladder and sensations of feeling the urge to urinate frequently and urgently may occur.      Once the stone passes out of your ureter and into your bladder, the symptoms of urgency and frequency will often disappear. Sometimes pain will come back for a short period and will not be as severe as before. The passage of the stone from your bladder and out of your body is usually not a problem. The urethra is at least twice as wide as the normal ureter, so the stone doesn t usually block it.    Strain all urine  If you pass the stone, save it. Place it in the container we have provided and bring it to the [facility] within a week of passing it. Your stone will then be sent for analysis which takes about a month.     Signs and symptoms you might experience    Nausea    Decreased appetite    Urinary frequency    Bloody urine     Chills    Fatigue    When to call [facility] or  go to the Emergency Room    Fever with a temperature greater than 100.1    Severe pain    Persistent nausea/vomiting    If the pain worsens or nausea/vomiting is uncontrolled with medications, STOP eating & drinking. You need to have an empty stomach for 8 hours prior to surgery. Call the [facility] immediately at [phone].           Follow-up    Low dose CT scan with doctor visit 1-2 weeks after initial clinic visit per doctor s instructions    Please cancel the CT scan visit if you pass a stone. Reschedule for a one month follow-up with doctor to discuss stone composition and future prevention.    Preventing future stones    Approximately a month after your stone is sent out for analysis, a prevention visit will occur with your provider, to discuss an individualized plan for prevention of new stones and to discuss managing stones that you may still have. Along with the analysis of the kidney stone, blood and urine tests may be indicated to develop this plan. Knowing the type of kidney stones you make, and why, allows the providers at the [facility] to recommend specific ways to prevent them.    Follow-up visits are an important part of monitoring and preventing future re-occurrences.    The [facility] is available for questions or concerns 24 hours a day at [phone]

## 2021-06-10 NOTE — TELEPHONE ENCOUNTER
Medication Request  Medication name:   lisinopril (PRINIVIL,ZESTRIL) 30 MG tablet  90 tablet  3  2019     Sig - Route: Take 1 tablet (30 mg total) by mouth daily. - Oral         [facility]  Reason for request: refill request, medication is .  When did you use medication last?:  Last fill 2020  Patient offered appointment:  N/A - electronic request  Okay to leave a detailed message: yes

## 2021-06-10 NOTE — TELEPHONE ENCOUNTER
Medication Request  Medication name:   hydroCHLOROthiazide (HYDRODIURIL) 25 MG tablet  90 tablet  3  2019  No    Sig - Route: Take 1 tablet (25 mg total) by mouth daily. - Oral        [facility]  Reason for request: refill request, medication is .  When did you use medication last?:  Last fill 2020  Patient offered appointment:  N/A - electronic request  Okay to leave a detailed message: yes

## 2021-06-10 NOTE — TELEPHONE ENCOUNTER
Message left for patient to call clinic to set up an appointment for kidney stone follow-up for today or tomorrow.  Catalina Bhardwaj RN

## 2021-06-15 NOTE — TELEPHONE ENCOUNTER
Called patient, advised needs to come in for a check and have his BP evaluated again for Dr. SIMPSON to complete forms    Patient expressed understanding and scheduled for this Friday with Dr. SIMPSON

## 2021-06-15 NOTE — PROGRESS NOTES
Assessment/Plan:        1. Anxiety    2. HTN (hypertension)       Reviewed and discussed current management.   In view of his pulse and BP, I don't see any hindrance in avoiding taking the higher dose of the propanolol.  This way, should help his anxiety and HTN both as he has room to improve on his BP.     Plan:   Continue with lisinopril and HCTZ at the current dose.   Start propanolol 60 mg.   follow up in 2 weeks.                Subjective:    Patient ID:   Mauri Manzanares is a 37 y.o. male comes in follow up of anxiety and HTN.   He has psychiatry scheduled in 2nd week in February.  Meanwhile, he checked back with the psychiatry clinic in [place], and they wanted him to up his propanolol dose to 60mg.  However, he was reluctant in taking the higher dose, wanting to consult here before doing so.     He has no other concerns.         allergies, current medications and problem list.    Review of Systems  A complete 10 point review of systems was obtained and is negative other than what is stated in the HPI.           Objective:   /80 (Patient Site: Right Arm, Patient Position: Sitting, Cuff Size: Adult Large)  Pulse 75  Temp 98.1  F (36.7  C) (Oral)   Wt (!) 306 lb (138.8 kg)  SpO2 99%  BMI 43.91 kg/m2      Physical Exam  General Appearance:    Alert, cooperative, no distress   Neck:   Supple, symmetrical, trachea midline, no adenopathy;     thyroid:  no enlargement/tenderness/nodules;    Lungs:     clear to auscultation, no wheezes, rales or rhonchi, symmetric air entry     Heart:    Regular rate and rhythm, S1 and S2 normal, no murmur, rub   or gallop, no edema    Skin:   Skin color, texture, turgor normal, no rashes or lesions

## 2021-06-15 NOTE — TELEPHONE ENCOUNTER
Shashi is calling from [facility].  Recvd form back from PCP however it is just signed.  The form does not indicate if it is okay for the patient to have extraction procedure    [facility] concern was related to his blood pressure.    Can call back with Verbal Authorization at  - [phone] Ext [phone]     Or please resubmit the form.     Pt appointment is scheduled for 03/01/2021

## 2021-06-15 NOTE — PROGRESS NOTES
PSYCHOLOGY CONSULTATION    Patient Name: Mauri Manzanares  MRN: [id number]  YOB: 1980    Date of Service: 1/16/2018    Start Time: 900  Stop Time: 1000    CPT Code: 68046  Length of Service: 1 hour    This is a Standard Diagnostic Assessment.    REFERRAL QUESTION:  The patient was referred by Dr. Sidhu for the evaluation of anxiety.    The purpose, benefits, and risks of psychological assessment and treatment were reviewed.  The patient agreed to proceed.  The patient was able to participate and benefit from treatment as evidenced by his verbal expression and understanding of ideas discussed.    PRESENTING PROBLEM:  Patient is a 37 y.o.-year-old, , , male who reported history of anxiety.  The patient indicated that these sxs began 3 weeks ago.     Chief Complaint: Patient reported his anxiety became unmanageable.     Patient's Expectation of Treatment: Patient indicated wanting to manage his anxiety.     Patient reported coming to [facility] outpatient mental health clinic due to an increase in his anxiety starting around Gladys. Patient indicated around Gladys he started to feel foggy. Patient reported then his anxiety became worse which resulted in 2 ER visits within 2 days. Patient indicated he then went to [facility] where he was prescribed medications. Patient indicated he had to take time off of work to help him manage. Patient reported he either needs to be laying down or moving consistently. Patient reported he also has not been sleeping more then 5 hours which has affected his mood as well. Patient indicated he has had anxiety but never to this extent.     REVIEW OF PSYCHIATRIC SYMPTOMS:      Depressive Symptoms:  Patient denied symptoms including disturbance of sleep, appetite, mood, concentration, energy level, or interest in pleasurable activities.      PANSI= Negative    Patient denied suicidal ideation, intent, and/or plan.     Manic  Symptoms:  Patient denied manic symptoms including elevated mood, racing thoughts, decreased need for sleep, and increased energy.    Anxiety Symptoms:  Patient reported excessive anxiety and worry, difficulty controlling the worry, restlessness, feeling keyed up or on edge, difficulty concentrating, muscle tension, sleep disturbance, inability to relax, racing thoughts.    As such it appears the patient meets criteria for generalized anxiety disorder.    Panic Symptoms:  Patient denies having panic attacks.    PTSD SYMPTOMS:  Patient denies any traumatic event(s).    Psychotic Symptoms:  Patient denies psychotic symptoms including hearing voices or seeing things.  Patient also does not exhibit disorganized and unpredictable behavior.    Cognitive Symptoms:  Patient denied that he experienced a head trauma or losing consciousness.    Patient denies cognitive symptoms including forgetfulness, losing track of time, getting lost, and difficulty with managing money.    Other:  Patient denied anger dyscontrol, binge eating, fever, multiple somatic complaints, weight loss.    Alcohol Abuse/Dependence:  Patient indicated that he drinks occasionally.     CAGE-AID was administered.  CAGE-AID = 0 indicating negative.     Drug Abuse/Dependence:   Patient denied using substances. No screening of drug abuse/dependence was administered because patient denied usage of drugs.    Nicotine:    Patient denied using nicotine.    Other Addictive Behavior(s): Patient denied other addictive behaviors.    Past Psychiatric History:  Patient indicated that he has a history of anxiety.  He has never been hospitalized for psychiatric illness  in his lifetime.  Patient s records were reviewed.      Medications:   Current Outpatient Prescriptions:      hydroCHLOROthiazide (HYDRODIURIL) 25 MG tablet, Take 1 tablet (25 mg total) by mouth daily., Disp: 30 tablet, Rfl: 0     hydrOXYzine HCl (ATARAX) 25 MG tablet, TK 1 TO 2 TS PO TID PRF ANXIETY,  "Disp: , Rfl: 0     lisinopril (PRINIVIL,ZESTRIL) 30 MG tablet, Take 1 tablet (30 mg total) by mouth daily., Disp: 30 tablet, Rfl: 0     LORazepam (ATIVAN) 0.5 MG tablet, TK 1 TO 2 TS PO Q 6 H PRF SEVERE ANXIETY, Disp: , Rfl: 0     propranolol (INDERAL) 20 MG tablet, TK 1 T PO BID, Disp: , Rfl: 0     traZODone (DESYREL) 50 MG tablet, TK 1 TO 2 TS PO D HS PRF SLEEP, Disp: , Rfl: 1      See medical records authored by Dr. Villagomez on 01/12/2018.    Past Medical History:   Past Medical History:   Diagnosis Date     Hypertension        See medical records authored by Dr. Villagomez on 01/12/2018.    Allergies/Adverse Reactions:  Review of patient's allergies indicates no known allergies.   See medical records authored by Dr. Villagomez on 01/12/2018.    Past Family Psychological/Mental Health History:  Patient denied any known mental health in his family.     Past Family Medical/Physical History: Per chart  Family History   Problem Relation Age of Onset     Hypertension Mother      No Medical Problems Father      Leukemia Brother      Past Family Chemical History:  Patient denied any known chemical dependence in his family.     MENTAL STATUS EXAMINATION:     Appearance: Patient is of average build, appropriately groomed and dressed, and who appears the same as stated age.    Behavior Towards Examiner/Attitude: Patient is cooperative and polite, friendly, affable.     Psychomotor Activity: Psychomotor activity is within normal limits.     Eye Contact: Good    Speech: Speech is of normal rate, normal tone, normal volume, and fluent rhythm.    Language: Expressive and Receptive      Affect: Affect is anxious    Mood: Patient described his mood as \"anxious.\"    Attention: Within Normal    Concentration: Within Normal      Thought Processes: Thoughts are logical and goal directed.    Thought Content/Perceptions: Patient has no hallucinations.         Memory: No evidence of impairment    Insight: Insight is " fair.    Judgment:  Judgment is grossly intact.     Estimated Intelligence: Average     Fund of Knowledge: Adequate     Therapeutic Martin: Therapeutic alliance  is growing.    SOCIAL HISTORY:    Patient indicated he was born and raised in [place], [place].      Family Constellation: Patient indicated that his parents have been  45 years.  He has 2 siblings including a brother 3 1/3 years older then him and his twin brother who passed away from Leukemia.     Nature of Family Interaction: Patient indicated having a pretty good childhood. Patient reported his mom stayed home until he was about 9. Patient talked about both his parents being present in his life. Patient indicated all his needs were met including food. Clothing and shelter.     Important Developmental Incidence:  The patient reportedly met all developmental milestones in a timely manner.      Childhood Problems: (ages 0-13): Patient denied physical, sexual and emotional abuse.    Adolescent Adjustment: (ages 13-18): No major adolescent adjustment issues noted.      Any maltreatment, trauma or abuse history (after age 18): No abuse history was noted or reported after the age of 18.      School Adjustment: Patient indicated that he excelled in school having a 4.0 GPA. Patient reported he graduated from [employer]. Patient indicated he went on to earn his bachelor's degree at [employer].    Patients work history includes working since he was 14.     He indicated that he currently is employed as a .     Social orientation/ Economic Status: Patient reported middle class.     Financial Concerns: Patient denied any financial concerns at this time.     Sexual Orientation: Patient indicated heterosexual.     Current Living situation including house hold membership and housing status: Patient reported he lives in a house with his wife and two children. Patient reported he feels safe in the home.       Marital/relationship history: Patient indicated he has been  16 years.     Significant Personal Relationships including patient's evaluation of the Relationship Quality: Patient reported his support network includes his wife, in-laws and his family.     Hobbies/Interests: Patient reported his hobbies and interests includes woodworking, cooking, hiking, friends and hanging with his kids.     Strengths/Personal/Community Resources: Patient indicated his strengths includes logical thinking, quick thinking and analytical.     Barriers to Care/Areas of Growth: Patient reported his barriers to care includes arrogant and his anxiety.      Spirituality beliefs: Patient indicated agnostic.     Cultural Identification: (Patient indicated the following:)     Race: White or     Experience of cultural bias: Patient denied ever experiencing any cultural bias.     Impact of culture: Patient reported his culture impacted him by teaching him the importance of family.     Immigration/history of status[place] raised in [place]    Level of acculturation: Acculturated     Time orientation: Young and present focused    Verbal Communication Style/languages: Does best in group settings    Locus of Control: Inward and outward    How does your Culture impact health and healthcare: Patient reported he goes to the doctor and takes medication as prescribed.     Health beliefs and engagement in cultural specific healing practices: Patient indicated using Western medicine for his physical and mental health needs.      Legal History: Patient denied a legal history.    Are there any other factors that are contributing to your presenting concerns? The patient indicated there are no other factors contributing to the presenting concerns.      How do you perceive your condition compared to how others perceive your presenting concerns: The patient reported he feels similarly about his problems when compared with others.    Family  Involvement: Is the family involved in the diagnotic assessment and process? Patient verbally indicated not wanting family part of therapy at this time.       CLINICAL FORMULATION:  Patient is a 37 y.o.-year-old, , , male who reported history of anxiety.The patient indicated that these sxs began 3 weeks ago. Chief Complaint: Patient reported his anxiety became unmanageable. Patient's Expectation of Treatment: Patient indicated wanting to manage his anxiety. Patient reported coming to [facility] outpatient mental health clinic due to an increase in his anxiety starting around Gladys. Patient indicated around Gladys he started to feel foggy. Patient reported then his anxiety became worse which resulted in 2 ER visits within 2 days. Patient indicated he then went to [facility] where he was prescribed medications. Patient indicated he had to take time off of work to help him manage. Patient reported he either needs to be laying down or moving consistently. Patient reported he also has not been sleeping more then 5 hours which has affected his mood as well. Patient indicated he has had anxiety but never to this extent. Patient meets DSM-5 criteria for generalized anxiety disorder as evidenced by excessive anxiety and worry, difficulty controlling the worry, restlessness, feeling keyed up or on edge, difficulty concentrating, muscle tension, sleep disturbance, inability to relax, racing thoughts.    REVIEW OF PSYCHIATRIC SYMPTOMS:         CLINICAL DIAGNOSIS:      Based upon patients reported symptoms, patient meets criteria for DSM5:  Generalized anxiety disorder    WHODAS #: 31.50%    TREATMENT RECOMMENDATIONS & FOLLOW-UP PLAN:   1. It appears that Mr. Manzanares could benefit from appropriate medication along with individual therapy.  It is important that the medication be prescribed and titrated very carefully and monitored very closely for its effects. Patient is scheduled to meet  with psychiatrist on 02/16.    2. It appears Mr. Manzanares could benefit from one on one psychotherapy. CBT and MI will be used to work on challenging negative thoughts and teaching copings skills.     Performed and documented by: JOSE Can

## 2021-06-15 NOTE — PROGRESS NOTES
MENTAL HEALTH VISIT NOTE  01/30/2018    Start time: 800    Stop Time: 853   Session # 1    Mauri Manzanares is a 37 y.o. male is being seen today for    Chief Complaint   Patient presents with      Follow Up     New symptoms or complaints: Patient indicated struggling with anxiety.     Functional Impairment:   Personal: 3  Family: 3  Work: 3  Social:2    Clinical assessment of mental status: Mauri Manzanares presented on time.  He was open and cooperative, and dressed appropriately for this session and weather. His memory was good for short and long term. He was oriented X4.   His  speech was soft.  Language was concise.  Concentration and focus is within normal limits. Psychosis is not noted or reported. He reports his mood is anxious.  Affect is congruent with speech.  Fund of knowledge is adequate. Insight is adequate for therapy.     Suicidal/Homicidal Ideation present: None Reported This Session    Patient's impression of their current status: Patient reported feeling anxious. Patient indicated the anxiety has not been as extreme as it was when he first met this therapist. Patient talked about being able to work which is something he was struggling with at the beginning of the month. Patient indicated he continues to have the anxiety occurring specifically on Sunday's. Patient reported noticing he has to push himself more on these days to be active. Patient indicated his sleep has continued to be a problem with him waking up and starting to have racing thoughts.     Therapist impression of patients current state: This patient appears to have fair insight into his mental health. This therapist processed with patient different triggers or times his anxiety occurs. This therapist challenged patient on ways lack of sleep may cause more anxiety. This therapist went over coping skills including grounding, progressive muscle relaxation, mindfulness and deep breathing.     Type of psychotherapeutic technique  provided: Insight oriented, Client centered and CBT    Progress toward short term goals:Progress as expected, with patient returning to scheduled session, continuing to medication mangement and noticing mental health symptoms.     Review of long term goals: Treatment Plan updated on 01/30/2018    Diagnosis:   1. VICKIE (generalized anxiety disorder)      Plan and Follow up: Patient will return in two weeks for scheduled session. Patient will benefit from following medication management recommendations from mental health urgent care. Patient should start to monitor days and times that his anxiety tends to increase. Patient would benefit from practicing coping skills taught in session.     Discharge Criteria/Planning: Patient will continue with follow-up until therapy can be discontinued without return of signs and symptoms.    Ilana Lee

## 2021-06-15 NOTE — PROGRESS NOTES
Assessment/Plan:        1. Hypertension  Blood pressure remains high despite the current measures  Discussed diet and exercise more effectively trying to lose about a pound or 2 per week, cut back in the salts and sugary drinks.     We will have his lisinopril changed to 30 mg dosage, continuing to take the hydrochlorothiazide at the current dose of 25, and propanolol remaining the same.     - lisinopril (PRINIVIL,ZESTRIL) 30 MG tablet; Take 1 tablet (30 mg total) by mouth daily.  Dispense: 30 tablet; Refill: 0  - hydroCHLOROthiazide (HYDRODIURIL) 25 MG tablet; Take 1 tablet (25 mg total) by mouth daily.  Dispense: 30 tablet; Refill: 0    Monitor blood pressure and follow-up in 1-2 weeks to recheck      2. Anxiety  Patient voiced concerns about his current anxiety and would like to see and be referred to a psychiatrist    - Ambulatory referral to Psychiatry            Subjective:    Patient ID:   Mauri Manzanares is a 37 y.o. male with a history of high blood pressure for the past several years but mainly maintained and managed by diet and exercise alone, comes in for follow-up of this medical problem which has recently been getting worse.  He states that shortly after [place] he had this general sense of feeling unwell to the point that he eventually came into the emergency room and was noted to have blood pressure high in 203/106, treated with lisinopril, hydrochlorothiazide, and atenolol.  The whole ordeal has been causing him a lot of anxiety also feeling into his high blood pressure.  Is constantly worried about his health since the fact that his twin brother has passed on from leukemia.    Only some much about his health because of anxiety, he was finally seen by psychiatrist, with help his anxiety with additional hydroxyzine,  Trazodone, and Ativan, and had switched up the beta blockers now taking propanolol.     Is also concerned about his weight and having questions about how to tackle this problem.  "         allergies, current medications, past family history, past medical history, past social history, past surgical history and problem list.    Review of Systems  A complete 10 point review of systems was obtained and is negative other than what is stated in the HPI.           Objective:   BP (!) 160/115 (Patient Site: Right Arm, Patient Position: Sitting, Cuff Size: Adult Large)  Pulse 89  Temp 97.9  F (36.6  C) (Oral)   Ht 5' 10\" (1.778 m)  Wt (!) 307 lb 3.2 oz (139.3 kg)  SpO2 99%  BMI 44.08 kg/m2      Physical Exam  General Appearance:    Alert, cooperative, no distress,    Eyes:    PERRL, conjunctiva/corneas clear,    Throat:   Lips, mucosa, and tongue normal; teeth and gums normal   Neck:   Supple, symmetrical, trachea midline, no adenopathy;        thyroid:  No enlargement/tenderness/nodules; no carotid    bruit or JVD   Lungs:     Clear to auscultation bilaterally, respirations unlabored   Heart:    Regular rate and rhythm, S1 and S2 normal, no murmur, rub   or gallop   Abdomen:     Soft, non-tender, normal bowel sounds, no rebound or guarding, no masses, no organomegaly   Extremities:   Extremities normal, atraumatic, no cyanosis or edema   Skin:   Skin color, texture, turgor normal, no rashes or lesions             "

## 2021-06-15 NOTE — PROGRESS NOTES
Outpatient Mental Health Treatment Plan    Name:  Mauri Manzanares  :  1980  MRN:  [id number]    Treatment Plan:  Initial Treatment Plan  Intake/initial treatment plan date:  2018  Benefit and risks and alternatives have been discussed: Yes  Is this treatment appropriate with minimal intrusion/restrictions: Yes  Estimated duration of treatment:  Ongoing therapy at this time  Anticipated frequency of services:  Every 2 weeks  Necessity for frequency: This frequency is needed to establish therapeutic goals and for continuity of care in order to monitor progress.  Necessity for treatment: To address cognitive, behavioral, and/or emotional barriers in order to work toward goals and to improve quality of life.      Plan:           ?   ? Anxiety    Goal:  Decrease average anxiety level from 4 to 1.   Strategies: ? [x]Learn and practice relaxation techniques and other coping strategies (e.g., thought stopping, reframing, meditation)     ? [x] Increase involvement in meaningful activities     ? [x] Discuss sleep hygiene     ? [x] Explore thoughts and expectations about self and others     ? [x] Identify and monitor triggers for panic/anxiety symptoms     ? [x] Implement physical activity routine (with physician approval)     ? [x] Consider introduction of bibliotherapy and/or videos     ? [x] Continue compliance with medical treatment plan (or explore barriers)      Degree to which this is a problem (1-4): 4  Degree to which goal is met (1-4): 1    Date of next review: 2018    Functional Impairment: 1=Not at all/Rarely  2=Some days  3=Most Days  4=Every Day   Personal: 3  Family: 2  Social: 3  Work: 3    Diagnosis:  VICKIE    WHODAS 2.0 12-item version= 31.50%    Clinical assessments completed: VICKIE-7, PHQ-9, Mood Questionnaire current, CAGE-AID, PANSI.      Strengths/Personal/Community Resources: Patient indicated his strengths includes logical thinking, quick thinking and analytical.      Barriers to  Care/Areas of Growth: Patient reported his barriers to care includes arrogant and his anxiety.       Cultural Identification: (Patient indicated the following:)   Race: White or   Experience of cultural bias: Patient denied ever experiencing any cultural bias.   Impact of culture: Patient reported his culture impacted him by teaching him the importance of family.   Immigration/history of status[place]  Level of acculturation: Acculturated   Time orientation: Young and present focused  Verbal Communication Style/languages: Does best in group settings  Locus of Control: Inward and outward    Persons responsible for this plan:  ? [x] Patient ? [x] Provider ? [] Other: __________________      Provider:Performed and documented by 1/24/2018    Date:  1/24/2018  Time:  8:53 AM        Patient Signature:____________________________________ Date: ______________     Guardian Signature: __________________________________ Date: ______________     Therapist Signature: __________________________________ Date: ______________

## 2021-06-15 NOTE — PROGRESS NOTES
"    Assessment & Plan     Hypertension, unspecified type  Previously elevated blood pressure at the dental office  Today's BP is normotensive range with no additional concerns  Plan:  Continue current management, with no change in meds      Need for vaccination  - Influenza, Seasonal Quad, PF =/> 6months    23 minutes spent on the date of the encounter doing chart review, patient visit and documentation        BMI:   Estimated body mass index is 44.05 kg/m  as calculated from the following:    Height as of this encounter: 5' 9\" (1.753 m).    Weight as of this encounter: 298 lb 4.8 oz (135.3 kg).     No follow-ups on file.    Dave Kirk MD  [facility]   Mauri Manzanares is 40 y.o. and presents today for the following health issues   HPI   Comes in follow-up for reassessment of hypertension, and the thumb spica elevated blood pressure.  He has a dental procedure coming up for which  needs to be in the normotensive range.   Is currently on hydrochlorothiazide 25 mg daily, lisinopril 30 mg daily, and propanolol LA 60 mg daily.  He is otherwise doing well           Objective    /76 (Patient Site: Right Arm, Patient Position: Sitting, Cuff Size: Adult Large)   Pulse 66   Temp 97.5  F (36.4  C) (Temporal)   Resp 18   Ht 5' 9\" (1.753 m)   Wt (!) 298 lb 4.8 oz (135.3 kg)   SpO2 100%   BMI 44.05 kg/m    Body mass index is 44.05 kg/m .  Physical Exam  General Appearance:    Alert, well hydrated, no distress   Lungs:     clear to auscultation, no wheezes, rales or rhonchi, symmetric air entry     Heart:    Regular rate and rhythm, S1 and S2 normal, no murmur, rub   or gallop, no edema    Skin:   Skin color, texture, turgor normal, no rashes or lesions                  "

## 2021-06-15 NOTE — PROGRESS NOTES
Assessment/Plan:        1. Hypertension, unspecified type    2. Anxiety       Today's vitals on blood pressure findings were discussed with the patient, and yet concerned with the elevated blood pressure. We discussed the option of increasing the dose of the  lisinopril vs continue with what we have at the current dose and monitoring blood pressure over the next few days, diet and exercise cut back on the carbs fats and salts and losing a few more lbs.   Patient was agreeable to the second option offered, I will be continuing to monitoring his blood pressure with close follow-up if not been able to achieve the optimize level.     Current Outpatient Prescriptions to continue   Medication Sig Dispense Refill     hydroCHLOROthiazide (HYDRODIURIL) 25 MG tablet Take 1 tablet (25 mg total) by mouth daily. 90 tablet 1     hydrOXYzine HCl (ATARAX) 25 MG tablet TK 1 TO 2 TS PO TID PRF ANXIETY  0     lisinopril (PRINIVIL,ZESTRIL) 30 MG tablet Take 1 tablet (30 mg total) by mouth daily. 90 tablet 1     LORazepam (ATIVAN) 0.5 MG tablet TK 1 TO 2 TS PO Q 6 H PRF SEVERE ANXIETY  0     propranolol (INDERAL LA) 60 mg 24 hr capsule Take 1 capsule (60 mg total) by mouth daily. 90 capsule 1     traZODone (DESYREL) 50 MG tablet TK 1 TO 2 TS PO D HS PRF SLEEP  1              Subjective:    Patient ID:   Mauri Manzanares is a 37 y.o. male comes in follow up of HTN.   His dose of propanolol was raised to 60 mg to help both with the BP and anxiety and advised to   Continue with lisinopril and HCTZ at the current dose.     He says that been checking his blood pressure been getting lower numbers than what he is been getting here at this clinic.   He has no other issues or concerns in the current regimen is working well for him with no side effects       allergies, current medications and problem list.    Review of Systems  A complete 5 point review of systems was obtained and is negative other than what is stated in the HPI.             Objective:   /88 (Patient Site: Right Arm, Patient Position: Sitting, Cuff Size: Adult Large)  Pulse 65  Temp 98.4  F (36.9  C) (Oral)   Wt (!) 307 lb 3.2 oz (139.3 kg)  SpO2 100%  BMI 44.08 kg/m2      Physical Exam  General Appearance:    Alert, well hydrated, no distress   Throat:   mucous membranes moist, pharynx normal without lesions   Neck:   Supple, symmetrical, trachea midline, no adenopathy;     thyroid:  no enlargement/tenderness/nodules;    Lungs:     clear to auscultation, no wheezes, rales or rhonchi, symmetric air entry     Heart:    Regular rate and rhythm, S1 and S2 normal, no murmur, rub   or gallop, no edema    Skin:   Skin color, texture, turgor normal, no rashes or lesions

## 2021-06-16 PROBLEM — I10 HTN (HYPERTENSION): Status: ACTIVE | Noted: 2018-01-06

## 2021-06-16 PROBLEM — F41.9 ANXIETY: Status: ACTIVE | Noted: 2018-01-26

## 2021-06-16 PROBLEM — E66.01 MORBID OBESITY (H): Status: ACTIVE | Noted: 2021-02-26

## 2021-06-16 NOTE — PROGRESS NOTES
MENTAL HEALTH VISIT NOTE    02/20/2018    Start time: 800    Stop Time: 845   Session # 2    Mauri Manzanares is a 37 y.o. male is being seen today for    Chief Complaint   Patient presents with      Follow Up     New symptoms or complaints: None    Functional Impairment:   Personal: 3  Family: 3  Work: 3  Social:2    Clinical assessment of mental status: Mauri Manzanares presented on time.  He was open and cooperative, and dressed appropriately for this session and weather. His memory was good for short and long term. He was oriented X4.   His  speech was soft.  Language was concise.  Concentration and focus is within normal limits. Psychosis is not noted or reported. He reports his mood is content.  Affect is congruent with speech.  Fund of knowledge is adequate. Insight is adequate for therapy.     Suicidal/Homicidal Ideation present: None Reported This Session    Patient's impression of their current status: Patient indicated feeling content. Patient reported using the progressive muscle relaxation and breathing exercises which has helped greatly. Patient indicated he has not had a lot of anxiety since seeing this therapist last. Patient reported he continues to worry about his health but the doctor not wanting to see him again for 6 months. Patient indicated he continues to have small worries but nothing compared to what he had when he started seeing this therapist.     Therapist impression of patients current state: This patient appears to have fair insight into his mental health. This therapist processed with patient ways he has found the coping skill to be successful. This therapist processed with patient other worries and the coping skills he is using to reduce worries. Due to patient's anxiety being managable at this time therapy is no longer needed. Patient was advised if anxiety was to return to schedule an appointment with this therapist.     Type of psychotherapeutic technique provided: Insight  oriented, Client centered and CBT    Progress toward short term goals:Progress as expected, with patient returning to scheduled session, continuing to medication mangement and noticing mental health symptoms.     Review of long term goals: Treatment Plan updated on 01/30/2018    Diagnosis:   1. VICKIE (generalized anxiety disorder)      Plan and Follow up: Patient will continue to use skills taught in session to help reduce anxiety as it occurs. Due to patient meeting goals he will be successfully discharged from therapy. Patient is informed if anxiety was to return he is welcome to come back to therapy.     Discharge Criteria/Planning: Patient discharged due to goals being met.     Ilana Lee

## 2021-06-17 NOTE — PATIENT INSTRUCTIONS - HE
Patient Instructions by Dave Kirk MD at 2/7/2019 12:00 PM     Author: Dave Kirk MD Service: -- Author Type: Physician    Filed: 2/14/2019  8:56 AM Encounter Date: 2/7/2019 Status: Signed    : Dave Kirk MD (Physician)

## 2021-06-18 NOTE — LETTER
Letter by Dave Kirk MD at      Author: Dave Kirk MD Service: -- Author Type: --    Filed:  Encounter Date: 2/11/2019 Status: (Other)       Mauri Manzanares  [address] [facility] [address]             February 11, 2019         Dear Mr. Manzanares,    Below are the results from your recent visit:    Resulted Orders   Lipid Profile   Result Value Ref Range    Triglycerides 311 (H) <=149 mg/dL    Cholesterol 164 <=199 mg/dL    LDL Calculated 74 <=129 mg/dL    HDL Cholesterol 28 (L) >=40 mg/dL    Patient Fasting > 8hrs? No    Basic Metabolic Panel   Result Value Ref Range    Sodium 139 136 - 145 mmol/L    Potassium 4.3 3.5 - 5.0 mmol/L    Chloride 102 98 - 107 mmol/L    CO2 27 22 - 31 mmol/L    Anion Gap, Calculation 10 5 - 18 mmol/L    Glucose 95 70 - 125 mg/dL    Calcium 9.6 8.5 - 10.5 mg/dL    BUN 20 8 - 22 mg/dL    Creatinine 1.30 0.70 - 1.30 mg/dL    GFR MDRD Af Amer >60 >60 mL/min/1.73m2    GFR MDRD Non Af Amer >60 >60 mL/min/1.73m2    Narrative    Fasting Glucose reference range is 70-99 mg/dL per  American Diabetes Association (ADA) guidelines.                        Elevated Triglycerides    Recommending to diet,  cutting back on the carbs and fat and exercise routinely and  effectively inorder to achieve a lower result    All other labs values are normal         Please call with questions or contact us using [employer].    Sincerely,        Electronically signed by Dave Kirk MD

## 2021-06-19 NOTE — PROGRESS NOTES
Assessment/Plan:        1. Hypertension, unspecified type  Normalizing     Plan :   Continue current management.   Plan for losing 1-2 lbs / week.     Refill:   - hydroCHLOROthiazide (HYDRODIURIL) 25 MG tablet; Take 1 tablet (25 mg total) by mouth daily.  Dispense: 90 tablet; Refill: 1  - lisinopril (PRINIVIL,ZESTRIL) 30 MG tablet; Take 1 tablet (30 mg total) by mouth daily.  Dispense: 90 tablet; Refill: 1  - propranolol (INDERAL LA) 60 mg 24 hr capsule; Take 1 capsule (60 mg total) by mouth daily.  Dispense: 90 capsule; Refill: 1    follow up in 6 months     2. Need for Tdap vaccination  - Tdap vaccine,  8yo or older,  IM               Subjective:    Patient ID:   Mauri Manzanares is a 37 y.o. male comes in follow up of his BP/ HTN.   He has noted his blood pressure to climb back down with modifying his lifestyle, exercising and eating healthier cutting back in the salts.  He has also joined a gym with his wife and keeping it more regular going to the gym.    No other concerns and current medications has been fitting well with no side effects      Review of Systems  General : negative  ThisRespiratory : no cough, shortness of breath, or wheezing  Cardiovascular : no chest pain or dyspnea on exertion  Dermatological : negative    Allergy: reviewed      The following patient's history were reviewed and updated as appropriate:   He  has a past medical history of Hypertension..      Outpatient Encounter Prescriptions as of 8/13/2018   Medication Sig Dispense Refill     hydroCHLOROthiazide (HYDRODIURIL) 25 MG tablet   3     lisinopril (PRINIVIL,ZESTRIL) 30 MG tablet   1     propranolol (INDERAL LA) 60 mg 24 hr capsule Take 1 capsule (60 mg total) by mouth daily. 90 capsule 1     hydroCHLOROthiazide (HYDRODIURIL) 25 MG tablet Take 1 tablet (25 mg total) by mouth daily. 90 tablet 1     hydrOXYzine HCl (ATARAX) 25 MG tablet TK 1 TO 2 TS PO TID PRF ANXIETY  0     lisinopril (PRINIVIL,ZESTRIL) 30 MG tablet Take 1 tablet  (30 mg total) by mouth daily. 90 tablet 1     LORazepam (ATIVAN) 0.5 MG tablet TK 1 TO 2 TS PO Q 6 H PRF SEVERE ANXIETY  0     traZODone (DESYREL) 50 MG tablet TK 1 TO 2 TS PO D HS PRF SLEEP  1     No facility-administered encounter medications on file as of 8/13/2018.          Objective:   /84 (Patient Site: Right Arm, Patient Position: Sitting, Cuff Size: Adult Large)  Pulse 81  Temp 98.4  F (36.9  C) (Oral)   Wt (!) 331 lb 1.6 oz (150.2 kg)  SpO2 96%  BMI 47.51 kg/m2      Physical Exam  General Appearance:    Alert, well hydrated, no distress   Throat:   mucous membranes moist, pharynx normal without lesions   Neck:   Supple, symmetrical, trachea midline, no adenopathy;     thyroid:  no enlargement/tenderness/nodules;    Lungs:     clear to auscultation, no wheezes, rales or rhonchi, symmetric air entry     Heart:    Regular rate and rhythm, S1 and S2 normal, no murmur, rub   or gallop, no edema    Skin:   Skin color, texture, turgor normal, no rashes or lesions

## 2021-06-19 NOTE — LETTER
Letter by Dave Kirk MD at      Author: Dave Kirk MD Service: -- Author Type: --    Filed:  Encounter Date: 8/12/2019 Status: (Other)         Mauri Manzanares  [address] [facility] [address]             August 12, 2019         Dear Mr. Manzanares,    Below are the results from your recent visit:    Resulted Orders   Lipid Profile   Result Value Ref Range    Triglycerides 230 (H) <=149 mg/dL    Cholesterol 161 <=199 mg/dL    LDL Calculated 89 <=129 mg/dL    HDL Cholesterol 26 (L) >=40 mg/dL    Patient Fasting > 8hrs? Yes    Basic Metabolic Panel   Result Value Ref Range    Sodium 139 136 - 145 mmol/L    Potassium 4.1 3.5 - 5.0 mmol/L    Chloride 103 98 - 107 mmol/L    CO2 26 22 - 31 mmol/L    Anion Gap, Calculation 10 5 - 18 mmol/L    Glucose 96 70 - 125 mg/dL    Calcium 9.9 8.5 - 10.5 mg/dL    BUN 15 8 - 22 mg/dL    Creatinine 1.20 0.70 - 1.30 mg/dL    GFR MDRD Af Amer >60 >60 mL/min/1.73m2    GFR MDRD Non Af Amer >60 >60 mL/min/1.73m2    Narrative    Fasting Glucose reference range is 70-99 mg/dL per  American Diabetes Association (ADA) guidelines.                  Elevated Triglycerides, but better in comparison than 6 months ago.           Please call with questions or contact us using [employer].    Sincerely,        Electronically signed by Dave Kirk MD

## 2021-06-19 NOTE — LETTER
Letter by Dave Kirk MD at      Author: Dave Kirk MD Service: -- Author Type: --    Filed:  Encounter Date: 8/12/2019 Status: (Other)         Mauri Manzanares  [address] [facility] [address]             August 12, 2019         Dear Mr. Manzanares,    Below are the results from your recent visit:    Resulted Orders   Lipid Profile   Result Value Ref Range    Triglycerides 230 (H) <=149 mg/dL    Cholesterol 161 <=199 mg/dL    LDL Calculated 89 <=129 mg/dL    HDL Cholesterol 26 (L) >=40 mg/dL    Patient Fasting > 8hrs? Yes    Basic Metabolic Panel   Result Value Ref Range    Sodium 139 136 - 145 mmol/L    Potassium 4.1 3.5 - 5.0 mmol/L    Chloride 103 98 - 107 mmol/L    CO2 26 22 - 31 mmol/L    Anion Gap, Calculation 10 5 - 18 mmol/L    Glucose 96 70 - 125 mg/dL    Calcium 9.9 8.5 - 10.5 mg/dL    BUN 15 8 - 22 mg/dL    Creatinine 1.20 0.70 - 1.30 mg/dL    GFR MDRD Af Amer >60 >60 mL/min/1.73m2    GFR MDRD Non Af Amer >60 >60 mL/min/1.73m2    Narrative    Fasting Glucose reference range is 70-99 mg/dL per  American Diabetes Association (ADA) guidelines.                   Normal        Please call with questions or contact us using [employer].    Sincerely,        Electronically signed by Dave Kirk MD

## 2021-06-20 NOTE — LETTER
Letter by Dave Kirk MD at      Author: Dave Kirk MD Service: -- Author Type: --    Filed:  Encounter Date: 8/31/2020 Status: (Other)         Mauri Manzanares  [address] [facility] [address]             August 31, 2020         Dear Mr. Manzanares,    Below are the results from your recent visit:    Resulted Orders   Basic Metabolic Panel   Result Value Ref Range    Sodium 138 136 - 145 mmol/L    Potassium 4.4 3.5 - 5.0 mmol/L    Chloride 101 98 - 107 mmol/L    CO2 26 22 - 31 mmol/L    Anion Gap, Calculation 11 5 - 18 mmol/L    Glucose 94 70 - 125 mg/dL    Calcium 9.6 8.5 - 10.5 mg/dL    BUN 28 (H) 8 - 22 mg/dL    Creatinine 1.50 (H) 0.70 - 1.30 mg/dL    GFR MDRD Af Amer >60 >60 mL/min/1.73m2    GFR MDRD Non Af Amer 52 (L) >60 mL/min/1.73m2    Narrative    Fasting Glucose reference range is 70-99 mg/dL per  American Diabetes Association (ADA) guidelines.   Glycosylated Hemoglobin A1c   Result Value Ref Range    Hemoglobin A1c 5.4 <=5.6 %      Comment:      Normal <5.7% Prediabete 5.7-6.4% Diabletes 6.5% or higher - adopted from ADA consensus guidelines   Lipid Profile   Result Value Ref Range    Triglycerides 166 (H) <=149 mg/dL    Cholesterol 154 <=199 mg/dL    LDL Calculated 96 <=129 mg/dL    HDL Cholesterol 25 (L) >=40 mg/dL    Patient Fasting > 8hrs? No         Normal A1c, with a value of 5.4 very close to prediabetic range      Normal <5.7% Prediabete 5.7-6.4%    Recheck level in 6 months     Please call with questions or contact us using [employer].    Sincerely,        Electronically signed by Dave Kirk MD

## 2021-06-20 NOTE — LETTER
Letter by Dave Kirk MD at      Author: Dave Kirk MD Service: -- Author Type: --    Filed:  Encounter Date: 8/31/2020 Status: (Other)         Mauri Manzanares  [address] [facility] [address]             August 31, 2020         Dear Mr. Manzanares,    Below are the results from your recent visit:    Resulted Orders   Basic Metabolic Panel   Result Value Ref Range    Sodium 138 136 - 145 mmol/L    Potassium 4.4 3.5 - 5.0 mmol/L    Chloride 101 98 - 107 mmol/L    CO2 26 22 - 31 mmol/L    Anion Gap, Calculation 11 5 - 18 mmol/L    Glucose 94 70 - 125 mg/dL    Calcium 9.6 8.5 - 10.5 mg/dL    BUN 28 (H) 8 - 22 mg/dL    Creatinine 1.50 (H) 0.70 - 1.30 mg/dL    GFR MDRD Af Amer >60 >60 mL/min/1.73m2    GFR MDRD Non Af Amer 52 (L) >60 mL/min/1.73m2    Narrative    Fasting Glucose reference range is 70-99 mg/dL per  American Diabetes Association (ADA) guidelines.   Glycosylated Hemoglobin A1c   Result Value Ref Range    Hemoglobin A1c 5.4 <=5.6 %      Comment:      Normal <5.7% Prediabete 5.7-6.4% Diabletes 6.5% or higher - adopted from ADA consensus guidelines   Lipid Profile   Result Value Ref Range    Triglycerides 166 (H) <=149 mg/dL    Cholesterol 154 <=199 mg/dL    LDL Calculated 96 <=129 mg/dL    HDL Cholesterol 25 (L) >=40 mg/dL    Patient Fasting > 8hrs? No         The 10-year ASCVD risk score ([place] YANET Jr., et al., 2013) is: 2%      Routine diet and exercise advised    Recheck in 1 year     Please call with questions or contact us using [employer].    Sincerely,        Electronically signed by Dave Kirk MD

## 2021-06-20 NOTE — LETTER
Letter by Dave Kirk MD at      Author: Dave Kirk MD Service: -- Author Type: --    Filed:  Encounter Date: 8/31/2020 Status: (Other)         Mauri Manzanares  [address] [facility] [address]             August 31, 2020         Dear Mr. Manzanares,    Below are the results from your recent visit:    Resulted Orders   Basic Metabolic Panel   Result Value Ref Range    Sodium 138 136 - 145 mmol/L    Potassium 4.4 3.5 - 5.0 mmol/L    Chloride 101 98 - 107 mmol/L    CO2 26 22 - 31 mmol/L    Anion Gap, Calculation 11 5 - 18 mmol/L    Glucose 94 70 - 125 mg/dL    Calcium 9.6 8.5 - 10.5 mg/dL    BUN 28 (H) 8 - 22 mg/dL    Creatinine 1.50 (H) 0.70 - 1.30 mg/dL    GFR MDRD Af Amer >60 >60 mL/min/1.73m2    GFR MDRD Non Af Amer 52 (L) >60 mL/min/1.73m2    Narrative    Fasting Glucose reference range is 70-99 mg/dL per  American Diabetes Association (ADA) guidelines.   Glycosylated Hemoglobin A1c   Result Value Ref Range    Hemoglobin A1c 5.4 <=5.6 %      Comment:      Normal <5.7% Prediabete 5.7-6.4% Diabletes 6.5% or higher - adopted from ADA consensus guidelines   Lipid Profile   Result Value Ref Range    Triglycerides 166 (H) <=149 mg/dL    Cholesterol 154 <=199 mg/dL    LDL Calculated 96 <=129 mg/dL    HDL Cholesterol 25 (L) >=40 mg/dL    Patient Fasting > 8hrs? No         Elevated creatinine    Most likely due to use of blood pressure medication    No significant change from the prior study    Keep hydrated and continue with the current management    Please call with questions or contact us using [employer].    Sincerely,        Electronically signed by Dave Kirk MD

## 2021-06-23 NOTE — PROGRESS NOTES
Assessment/Plan:        1. Hypertension, unspecified type  Normotensive   Continue current management     Refll:   - propranolol (INDERAL LA) 60 mg 24 hr capsule; Take 1 capsule (60 mg total) by mouth daily.  Dispense: 90 capsule; Refill: 1  - lisinopril (PRINIVIL,ZESTRIL) 30 MG tablet; Take 1 tablet (30 mg total) by mouth daily.  Dispense: 90 tablet; Refill: 1  - hydroCHLOROthiazide (HYDRODIURIL) 25 MG tablet; Take 1 tablet (25 mg total) by mouth daily.  Dispense: 90 tablet; Refill: 1  - Lipid Profile  - Basic Metabolic Panel; Future  - Basic Metabolic Panel    Follow up in 6 months.     2. Anxiety  Stable  Continue current management   - hydrOXYzine HCl (ATARAX) 25 MG tablet; TK 1 TO 2 TS PO TID PRF ANXIETY  Dispense: 90 tablet; Refill: 1               Subjective:    Patient ID:   Mauri Manzanares is a 38 y.o. male comes in follow up to last visit on 8/13.      HTN:   Been working on losing weight and improving lifestyle and diet.   No med intolerance or side effects   On HCTZ, Lisinopril, and propranolol, needing a refill    Anxiety:   Taking hydroxyzine prn   Does fine on this regimen.         Review of Systems  General : negative  Ophthalmic : negative  ENT : negative  Respiratory : no cough, shortness of breath, or wheezing  Cardiovascular : no chest pain or dyspnea on exertion  Gastrointestinal : no abdominal pain, change in bowel habits, or black or bloody stools  Genito-Urinary :  no dysuria, trouble voiding, or hematuria  Musculoskeletal : negative  Neurological : negative  Dermatological : negative    Allergy: reviewed      The following patient's history were reviewed and updated as appropriate:   He  has a past medical history of Hypertension..      Outpatient Encounter Medications as of 2/7/2019   Medication Sig Dispense Refill     hydrOXYzine HCl (ATARAX) 25 MG tablet TK 1 TO 2 TS PO TID PRF ANXIETY  0     propranolol (INDERAL LA) 60 mg 24 hr capsule Take 1 capsule (60 mg total) by mouth daily. 90  capsule 1     hydroCHLOROthiazide (HYDRODIURIL) 25 MG tablet Take 1 tablet (25 mg total) by mouth daily. 90 tablet 1     lisinopril (PRINIVIL,ZESTRIL) 30 MG tablet Take 1 tablet (30 mg total) by mouth daily. 90 tablet 1     traZODone (DESYREL) 50 MG tablet TK 1 TO 2 TS PO D HS PRF SLEEP  1     [DISCONTINUED] LORazepam (ATIVAN) 0.5 MG tablet TK 1 TO 2 TS PO Q 6 H PRF SEVERE ANXIETY  0     No facility-administered encounter medications on file as of 2/7/2019.          Objective:   /70 (Patient Site: Right Arm, Patient Position: Sitting, Cuff Size: Adult Large)   Pulse 84   Wt (!) 340 lb 4.8 oz (154.4 kg)   SpO2 99%   BMI 48.83 kg/m        Physical Exam  General Appearance:    Alert, well hydrated, no distress   Throat:   mucous membranes moist, pharynx normal without lesions   Neck:   Supple, symmetrical, trachea midline, no adenopathy;     thyroid:  no enlargement/tenderness/nodules;    Lungs:     clear to auscultation, no wheezes, rales or rhonchi, symmetric air entry     Heart:    Regular rate and rhythm, S1 and S2 normal, no murmur, rub   or gallop, no edema    Skin:   Skin color, texture, turgor normal, no rashes or lesions

## 2021-07-03 NOTE — ADDENDUM NOTE
Addendum Note by Valery Farias CMA at 6/5/2019 12:32 PM     Author: Valery Farias CMA Service: -- Author Type: Certified Medical Assistant    Filed: 6/5/2019 12:32 PM Encounter Date: 6/4/2019 Status: Signed    : Valery Farias CMA (Certified Medical Assistant)    Addended by: VALERY FARIAS on: 6/5/2019 12:32 PM        Modules accepted: Orders

## 2021-09-19 ENCOUNTER — HEALTH MAINTENANCE LETTER (OUTPATIENT)
Age: 41
End: 2021-09-19

## 2021-10-19 ENCOUNTER — MYC REFILL (OUTPATIENT)
Dept: FAMILY MEDICINE | Facility: CLINIC | Age: 41
End: 2021-10-19

## 2021-10-19 DIAGNOSIS — I10 HYPERTENSION, UNSPECIFIED TYPE: ICD-10-CM

## 2021-10-20 RX ORDER — HYDROCHLOROTHIAZIDE 25 MG/1
25 TABLET ORAL DAILY
Qty: 90 TABLET | Refills: 0 | Status: SHIPPED | OUTPATIENT
Start: 2021-10-20 | End: 2022-01-19

## 2021-10-20 RX ORDER — PROPRANOLOL HCL 60 MG
60 CAPSULE, EXTENDED RELEASE 24HR ORAL DAILY
Qty: 90 CAPSULE | Refills: 0 | Status: SHIPPED | OUTPATIENT
Start: 2021-10-20 | End: 2022-01-19

## 2021-10-20 RX ORDER — LISINOPRIL 30 MG/1
30 TABLET ORAL DAILY
Qty: 90 TABLET | Refills: 0 | Status: SHIPPED | OUTPATIENT
Start: 2021-10-20 | End: 2022-01-19

## 2021-10-20 NOTE — TELEPHONE ENCOUNTER
"Routing refill request to provider for review/approval because:  Labs not current:  multiple  Inactivated med warning    Last Written Prescription Date:  8/28/20  Last Fill Quantity: 90,  # refills: 3   Last office visit provider:  2/26/21     Last Written Prescription Date:  8/28/20  Last Fill Quantity: 90,  # refills: 3   Last office visit provider:  2/26/21     Requested Prescriptions   Pending Prescriptions Disp Refills     Propranolol HCl (INDERAL LA) 60 MG CPCR 90 capsule 3     Sig: Take 60 mg by mouth daily       Beta-Blockers Protocol Passed - 10/19/2021  4:14 PM        Passed - Blood pressure under 140/90 in past 12 months     BP Readings from Last 3 Encounters:   02/26/21 130/76   08/28/20 118/72                 Passed - Patient is age 6 or older        Passed - Recent (12 mo) or future (30 days) visit within the authorizing provider's specialty     Patient has had an office visit with the authorizing provider or a provider within the authorizing providers department within the previous 12 mos or has a future within next 30 days. See \"Patient Info\" tab in inbasket, or \"Choose Columns\" in Meds & Orders section of the refill encounter.              Passed - Medication is active on med list           lisinopril (ZESTRIL) 30 MG tablet 90 tablet 3     Sig: Take 1 tablet (30 mg) by mouth daily       ACE Inhibitors (Including Combos) Protocol Failed - 10/19/2021  4:14 PM        Failed - Normal serum creatinine on file in past 12 months     Recent Labs   Lab Test 08/28/20  1024   CR 1.50*       Ok to refill medication if creatinine is low          Failed - Normal serum potassium on file in past 12 months     Recent Labs   Lab Test 08/28/20  1024   POTASSIUM 4.4             Passed - Blood pressure under 140/90 in past 12 months     BP Readings from Last 3 Encounters:   02/26/21 130/76   08/28/20 118/72                 Passed - Recent (12 mo) or future (30 days) visit within the authorizing provider's specialty     " "Patient has had an office visit with the authorizing provider or a provider within the authorizing providers department within the previous 12 mos or has a future within next 30 days. See \"Patient Info\" tab in inbasket, or \"Choose Columns\" in Meds & Orders section of the refill encounter.              Passed - Medication is active on med list        Passed - Patient is age 18 or older           hydrochlorothiazide (HYDRODIURIL) 25 MG tablet 90 tablet 3     Sig: Take 1 tablet (25 mg) by mouth daily       Diuretics (Including Combos) Protocol Failed - 10/19/2021  4:14 PM        Failed - Normal serum creatinine on file in past 12 months     Recent Labs   Lab Test 08/28/20  1024   CR 1.50*              Failed - Normal serum potassium on file in past 12 months     Recent Labs   Lab Test 08/28/20  1024   POTASSIUM 4.4                    Failed - Normal serum sodium on file in past 12 months     Recent Labs   Lab Test 08/28/20  1024                 Passed - Blood pressure under 140/90 in past 12 months     BP Readings from Last 3 Encounters:   02/26/21 130/76   08/28/20 118/72                 Passed - Recent (12 mo) or future (30 days) visit within the authorizing provider's specialty     Patient has had an office visit with the authorizing provider or a provider within the authorizing providers department within the previous 12 mos or has a future within next 30 days. See \"Patient Info\" tab in inbasket, or \"Choose Columns\" in Meds & Orders section of the refill encounter.              Passed - Medication is active on med list        Passed - Patient is age 18 or older             Kristina Hilton RN 10/20/21 3:23 PM  "

## 2022-01-19 ENCOUNTER — MYC REFILL (OUTPATIENT)
Dept: FAMILY MEDICINE | Facility: CLINIC | Age: 42
End: 2022-01-19
Payer: COMMERCIAL

## 2022-01-19 DIAGNOSIS — F41.9 ANXIETY: ICD-10-CM

## 2022-01-21 RX ORDER — HYDROXYZINE HYDROCHLORIDE 25 MG/1
25 TABLET, FILM COATED ORAL
Qty: 90 TABLET | Refills: 3 | Status: SHIPPED | OUTPATIENT
Start: 2022-01-21

## 2022-01-21 NOTE — TELEPHONE ENCOUNTER
Medication is being filled for 1 time refill only due to:  Patient needs to be seen because need recheck.

## 2022-04-20 DIAGNOSIS — I10 HYPERTENSION, UNSPECIFIED TYPE: ICD-10-CM

## 2022-04-20 NOTE — LETTER
To:  Mauri Manzanares  [address] [id number]      [phone]      April 29, 2022      Dear Mr. Manzanares:       Time goes fast, and a clinic visit to look at your medical issues and medications is overdue.  Please call us at [phone] to schedule a follow up appointment regarding your Propranolol.     Please make a clinic  appointment  in the near future and bring all your pill bottles with you.  If you have already made an appointment then I apologize for overlooking that fact, and please disregard this letter.    I am looking forward to seeing you soon.    Sincerely,    BRYON Shaw with Dr. Kirk  [facility]

## 2022-04-20 NOTE — TELEPHONE ENCOUNTER
Pending Prescriptions:                       Disp   Refills    propranolol ER (INDERAL LA) 60 MG 24 hr c*90 cap*0            Sig: Take 1 capsule (60 mg) by mouth daily

## 2022-04-20 NOTE — TELEPHONE ENCOUNTER
Pending Prescriptions:                       Disp   Refills    hydrochlorothiazide (HYDRODIURIL) 25 MG t*90 tab*0            Sig: Take 1 tablet (25 mg) by mouth daily

## 2022-04-21 RX ORDER — PROPRANOLOL HCL 60 MG
60 CAPSULE, EXTENDED RELEASE 24HR ORAL DAILY
Qty: 90 CAPSULE | Refills: 0 | OUTPATIENT
Start: 2022-04-21

## 2022-04-21 RX ORDER — HYDROCHLOROTHIAZIDE 25 MG/1
25 TABLET ORAL DAILY
Qty: 90 TABLET | Refills: 0 | OUTPATIENT
Start: 2022-04-21

## 2022-06-26 ENCOUNTER — HEALTH MAINTENANCE LETTER (OUTPATIENT)
Age: 42
End: 2022-06-26

## 2022-11-21 ENCOUNTER — HEALTH MAINTENANCE LETTER (OUTPATIENT)
Age: 42
End: 2022-11-21

## 2023-07-09 ENCOUNTER — HEALTH MAINTENANCE LETTER (OUTPATIENT)
Age: 43
End: 2023-07-09